# Patient Record
Sex: FEMALE | Race: BLACK OR AFRICAN AMERICAN | NOT HISPANIC OR LATINO | Employment: FULL TIME | URBAN - METROPOLITAN AREA
[De-identification: names, ages, dates, MRNs, and addresses within clinical notes are randomized per-mention and may not be internally consistent; named-entity substitution may affect disease eponyms.]

---

## 2023-04-07 ENCOUNTER — HOSPITAL ENCOUNTER (OUTPATIENT)
Dept: DATA CONVERSION | Facility: HOSPITAL | Age: 53
End: 2023-04-07
Attending: INTERNAL MEDICINE | Admitting: INTERNAL MEDICINE

## 2023-04-07 DIAGNOSIS — Z00.6 ENCOUNTER FOR EXAMINATION FOR NORMAL COMPARISON AND CONTROL IN CLINICAL RESEARCH PROGRAM: ICD-10-CM

## 2023-05-04 LAB
ALANINE AMINOTRANSFERASE (SGPT) (U/L) IN SER/PLAS: 12 U/L (ref 7–45)
ALBUMIN (G/DL) IN SER/PLAS: 4.1 G/DL (ref 3.4–5)
ALBUMIN (G/DL) IN SER/PLAS: 4.1 G/DL (ref 3.4–5)
ALKALINE PHOSPHATASE (U/L) IN SER/PLAS: 85 U/L (ref 33–110)
ANION GAP IN SER/PLAS: 14 MMOL/L (ref 10–20)
ANION GAP IN SER/PLAS: 14 MMOL/L (ref 10–20)
ASPARTATE AMINOTRANSFERASE (SGOT) (U/L) IN SER/PLAS: 19 U/L (ref 9–39)
BASOPHILS (10*3/UL) IN BLOOD BY AUTOMATED COUNT: 0.03 X10E9/L (ref 0–0.1)
BASOPHILS/100 LEUKOCYTES IN BLOOD BY AUTOMATED COUNT: 0.3 % (ref 0–2)
BILIRUBIN TOTAL (MG/DL) IN SER/PLAS: 0.4 MG/DL (ref 0–1.2)
CALCIDIOL (25 OH VITAMIN D3) (NG/ML) IN SER/PLAS: 24 NG/ML
CALCIDIOL (25 OH VITAMIN D3) (NG/ML) IN SER/PLAS: 25 NG/ML
CALCIUM (MG/DL) IN SER/PLAS: 9.4 MG/DL (ref 8.6–10.6)
CALCIUM (MG/DL) IN SER/PLAS: 9.6 MG/DL (ref 8.6–10.6)
CARBON DIOXIDE, TOTAL (MMOL/L) IN SER/PLAS: 27 MMOL/L (ref 21–32)
CARBON DIOXIDE, TOTAL (MMOL/L) IN SER/PLAS: 28 MMOL/L (ref 21–32)
CD3+CD4+ ABSOLUTE: 0.36 X10E9/L (ref 0.35–2.74)
CD3+CD8+ ABSOLUTE: 0.33 X10E9/L (ref 0.08–1.49)
CD4/CD8 RATIO: 1.09 (ref 1–3.5)
CD45%: 100 %
CHLAMYDIA TRACH., AMPLIFIED: NEGATIVE
CHLORIDE (MMOL/L) IN SER/PLAS: 104 MMOL/L (ref 98–107)
CHLORIDE (MMOL/L) IN SER/PLAS: 105 MMOL/L (ref 98–107)
CP CD3+CD4+%: 24 % (ref 29–57)
CP CD3+CD8+%: 22 % (ref 7–31)
CREATININE (MG/DL) IN SER/PLAS: 0.72 MG/DL (ref 0.5–1.05)
CREATININE (MG/DL) IN SER/PLAS: 0.73 MG/DL (ref 0.5–1.05)
EOSINOPHILS (10*3/UL) IN BLOOD BY AUTOMATED COUNT: 0.14 X10E9/L (ref 0–0.7)
EOSINOPHILS/100 LEUKOCYTES IN BLOOD BY AUTOMATED COUNT: 1.4 % (ref 0–6)
ERYTHROCYTE DISTRIBUTION WIDTH (RATIO) BY AUTOMATED COUNT: 16.3 % (ref 11.5–14.5)
ERYTHROCYTE MEAN CORPUSCULAR HEMOGLOBIN CONCENTRATION (G/DL) BY AUTOMATED: 30 G/DL (ref 32–36)
ERYTHROCYTE MEAN CORPUSCULAR VOLUME (FL) BY AUTOMATED COUNT: 71 FL (ref 80–100)
ERYTHROCYTES (10*6/UL) IN BLOOD BY AUTOMATED COUNT: 6.16 X10E12/L (ref 4–5.2)
FMETH: ABNORMAL
FSIT1: ABNORMAL
GFR FEMALE: >90 ML/MIN/1.73M2
GFR FEMALE: >90 ML/MIN/1.73M2
GLUCOSE (MG/DL) IN SER/PLAS: 69 MG/DL (ref 74–99)
GLUCOSE (MG/DL) IN SER/PLAS: 72 MG/DL (ref 74–99)
HEMATOCRIT (%) IN BLOOD BY AUTOMATED COUNT: 44 % (ref 36–46)
HEMOGLOBIN (G/DL) IN BLOOD: 13.2 G/DL (ref 12–16)
IMMATURE GRANULOCYTES/100 LEUKOCYTES IN BLOOD BY AUTOMATED COUNT: 0.2 % (ref 0–0.9)
IRON (UG/DL) IN SER/PLAS: 95 UG/DL (ref 35–150)
IRON BINDING CAPACITY (UG/DL) IN SER/PLAS: 329 UG/DL (ref 240–445)
IRON SATURATION (%) IN SER/PLAS: 29 % (ref 25–45)
LEUKOCYTES (10*3/UL) IN BLOOD BY AUTOMATED COUNT: 9.7 X10E9/L (ref 4.4–11.3)
LYMPHOCYTES (10*3/UL) IN BLOOD BY AUTOMATED COUNT: 1.49 X10E9/L (ref 1.2–4.8)
LYMPHOCYTES/100 LEUKOCYTES IN BLOOD BY AUTOMATED COUNT: 15.4 % (ref 13–44)
MAGNESIUM (MG/DL) IN SER/PLAS: 1.95 MG/DL (ref 1.6–2.4)
MARKER INTERPRETATION: ABNORMAL
MONOCYTES (10*3/UL) IN BLOOD BY AUTOMATED COUNT: 0.55 X10E9/L (ref 0.1–1)
MONOCYTES/100 LEUKOCYTES IN BLOOD BY AUTOMATED COUNT: 5.7 % (ref 2–10)
N. GONORRHEA, AMPLIFIED: NEGATIVE
NEUTROPHILS (10*3/UL) IN BLOOD BY AUTOMATED COUNT: 7.46 X10E9/L (ref 1.2–7.7)
NEUTROPHILS/100 LEUKOCYTES IN BLOOD BY AUTOMATED COUNT: 77 % (ref 40–80)
NRBC (PER 100 WBCS) BY AUTOMATED COUNT: 0 /100 WBC (ref 0–0)
PARATHYRIN INTACT (PG/ML) IN SER/PLAS: 53.6 PG/ML (ref 18.5–88)
PHOSPHATE (MG/DL) IN SER/PLAS: 3.9 MG/DL (ref 2.5–4.9)
PLATELETS (10*3/UL) IN BLOOD AUTOMATED COUNT: 276 X10E9/L (ref 150–450)
POTASSIUM (MMOL/L) IN SER/PLAS: 4.1 MMOL/L (ref 3.5–5.3)
POTASSIUM (MMOL/L) IN SER/PLAS: 4.1 MMOL/L (ref 3.5–5.3)
PROTEIN TOTAL: 7.1 G/DL (ref 6.4–8.2)
SODIUM (MMOL/L) IN SER/PLAS: 141 MMOL/L (ref 136–145)
SODIUM (MMOL/L) IN SER/PLAS: 143 MMOL/L (ref 136–145)
SYPHILIS TOTAL AB: NONREACTIVE
THYROPEROXIDASE AB (IU/ML) IN SER/PLAS: 74 IU/ML
THYROTROPIN (MIU/L) IN SER/PLAS BY DETECTION LIMIT <= 0.05 MIU/L: 4.3 MIU/L (ref 0.44–3.98)
THYROXINE (T4) FREE (NG/DL) IN SER/PLAS: 0.87 NG/DL (ref 0.78–1.48)
TRIIODOTHYRONINE (T3) (NG/DL) IN SER/PLAS: 86 NG/DL (ref 60–200)
UREA NITROGEN (MG/DL) IN SER/PLAS: 17 MG/DL (ref 6–23)
UREA NITROGEN (MG/DL) IN SER/PLAS: 18 MG/DL (ref 6–23)

## 2023-05-05 LAB
HIV-1 RNA PCR VIRAL LOAD LOG: NORMAL LOG10 CPY/ML
HIV-1 RNA VIRAL LOAD: NOT DETECTED COPIES/ML

## 2023-05-08 LAB — VITAMIN D 1,25-DIHYDROXY: 73.4 PG/ML (ref 19.9–79.3)

## 2023-05-25 LAB
AMPHETAMINE (PRESENCE) IN URINE BY SCREEN METHOD: NORMAL
BARBITURATES PRESENCE IN URINE BY SCREEN METHOD: NORMAL
BENZODIAZEPINE (PRESENCE) IN URINE BY SCREEN METHOD: NORMAL
CANNABINOIDS IN URINE BY SCREEN METHOD: NORMAL
COCAINE (PRESENCE) IN URINE BY SCREEN METHOD: NORMAL
DRUG SCREEN COMMENT URINE: NORMAL
FENTANYL URINE: NORMAL
METHADONE (PRESENCE) IN URINE BY SCREEN METHOD: NORMAL
OPIATES (PRESENCE) IN URINE BY SCREEN METHOD: NORMAL
OXYCODONE (PRESENCE) IN URINE BY SCREEN METHOD: NORMAL
PHENCYCLIDINE (PRESENCE) IN URINE BY SCREEN METHOD: NORMAL

## 2023-05-29 LAB — ETHYL GLUCURONIDE SCREEN: POSITIVE NG/ML

## 2023-05-31 LAB
ETHYL GLUCURONIDE: NORMAL NG/ML
ETHYL SULFATE,U: NORMAL NG/ML

## 2023-06-01 LAB
6-ACETYLMORPHINE: <25 NG/ML
AMPHETAMINE (PRESENCE) IN URINE BY SCREEN METHOD: NORMAL
BARBITURATES PRESENCE IN URINE BY SCREEN METHOD: NORMAL
BENZODIAZEPINE (PRESENCE) IN URINE BY SCREEN METHOD: NORMAL
CANNABINOIDS IN URINE BY SCREEN METHOD: NORMAL
COCAINE (PRESENCE) IN URINE BY SCREEN METHOD: NORMAL
CODEINE: <50 NG/ML
DRUG SCREEN COMMENT URINE: NORMAL
FENTANYL URINE: NORMAL
HYDROCODONE: <25 NG/ML
HYDROMORPHONE: <25 NG/ML
METHADONE (PRESENCE) IN URINE BY SCREEN METHOD: NORMAL
MORPHINE URINE: <50 NG/ML
NORHYDROCODONE: <25 NG/ML
NOROXYCODONE: <25 NG/ML
OPIATES (PRESENCE) IN URINE BY SCREEN METHOD: NORMAL
OXYCODONE (PRESENCE) IN URINE BY SCREEN METHOD: NORMAL
OXYCODONE: <25 NG/ML
OXYMORPHONE: <25 NG/ML
PHENCYCLIDINE (PRESENCE) IN URINE BY SCREEN METHOD: NORMAL

## 2023-06-05 LAB — ETHYL GLUCURONIDE SCREEN: NEGATIVE NG/ML

## 2023-06-06 LAB
7-AMINOCLONAZEPAM: <25 NG/ML
ALPHA-HYDROXYALPRAZOLAM: <25 NG/ML
ALPHA-HYDROXYMIDAZOLAM: <25 NG/ML
ALPRAZOLAM: <25 NG/ML
AMPHETAMINE (PRESENCE) IN URINE BY SCREEN METHOD: NORMAL
BARBITURATES PRESENCE IN URINE BY SCREEN METHOD: NORMAL
BENZODIAZEPINE (PRESENCE) IN URINE BY SCREEN METHOD: NORMAL
CANNABINOIDS IN URINE BY SCREEN METHOD: NORMAL
CHLORDIAZEPOXIDE: <25 NG/ML
CLONAZEPAM: <25 NG/ML
COCAINE (PRESENCE) IN URINE BY SCREEN METHOD: NORMAL
DIAZEPAM: <25 NG/ML
DRUG SCREEN COMMENT URINE: NORMAL
FENTANYL URINE: NORMAL
LORAZEPAM: <25 NG/ML
METHADONE (PRESENCE) IN URINE BY SCREEN METHOD: NORMAL
MIDAZOLAM: <25 NG/ML
NORDIAZEPAM: <25 NG/ML
OPIATES (PRESENCE) IN URINE BY SCREEN METHOD: NORMAL
OXAZEPAM: <25 NG/ML
OXYCODONE (PRESENCE) IN URINE BY SCREEN METHOD: NORMAL
PHENCYCLIDINE (PRESENCE) IN URINE BY SCREEN METHOD: NORMAL
TEMAZEPAM: <25 NG/ML

## 2023-06-08 LAB
6-ACETYLMORPHINE: <25 NG/ML
7-AMINOCLONAZEPAM: <25 NG/ML
ALPHA-HYDROXYALPRAZOLAM: <25 NG/ML
ALPHA-HYDROXYMIDAZOLAM: <25 NG/ML
ALPRAZOLAM: <25 NG/ML
CHLORDIAZEPOXIDE: <25 NG/ML
CLONAZEPAM: <25 NG/ML
CODEINE: <50 NG/ML
DIAZEPAM: <25 NG/ML
HYDROCODONE: <25 NG/ML
HYDROMORPHONE: <25 NG/ML
LORAZEPAM: <25 NG/ML
MIDAZOLAM: <25 NG/ML
MORPHINE URINE: <50 NG/ML
NORDIAZEPAM: <25 NG/ML
NORHYDROCODONE: <25 NG/ML
NOROXYCODONE: <25 NG/ML
OXAZEPAM: <25 NG/ML
OXYCODONE: <25 NG/ML
OXYMORPHONE: <25 NG/ML
TEMAZEPAM: <25 NG/ML

## 2023-06-11 LAB — ETHYL GLUCURONIDE SCREEN: NEGATIVE NG/ML

## 2023-06-29 LAB — ETHYL GLUCURONIDE SCREEN: NEGATIVE NG/ML

## 2023-07-10 LAB — ETHYL GLUCURONIDE SCREEN: NEGATIVE NG/ML

## 2023-09-06 VITALS — BODY MASS INDEX: 38.86 KG/M2 | WEIGHT: 233.25 LBS | HEIGHT: 65 IN

## 2023-09-07 LAB
ALANINE AMINOTRANSFERASE (SGPT) (U/L) IN SER/PLAS: 14 U/L (ref 7–45)
ALBUMIN (G/DL) IN SER/PLAS: 4.2 G/DL (ref 3.4–5)
ALKALINE PHOSPHATASE (U/L) IN SER/PLAS: 84 U/L (ref 33–110)
ANION GAP IN SER/PLAS: 12 MMOL/L (ref 10–20)
ASPARTATE AMINOTRANSFERASE (SGOT) (U/L) IN SER/PLAS: 18 U/L (ref 9–39)
BASOPHILS (10*3/UL) IN BLOOD BY AUTOMATED COUNT: 0.04 X10E9/L (ref 0–0.1)
BASOPHILS/100 LEUKOCYTES IN BLOOD BY AUTOMATED COUNT: 0.5 % (ref 0–2)
BILIRUBIN TOTAL (MG/DL) IN SER/PLAS: 0.5 MG/DL (ref 0–1.2)
CALCIDIOL (25 OH VITAMIN D3) (NG/ML) IN SER/PLAS: 25 NG/ML
CALCIUM (MG/DL) IN SER/PLAS: 9.3 MG/DL (ref 8.6–10.6)
CARBON DIOXIDE, TOTAL (MMOL/L) IN SER/PLAS: 29 MMOL/L (ref 21–32)
CD3+CD4+ ABSOLUTE: 0.41 X10E9/L (ref 0.35–2.74)
CD3+CD8+ ABSOLUTE: 0.35 X10E9/L (ref 0.08–1.49)
CD4/CD8 RATIO: 1.18 (ref 1–3.5)
CD45%: 100 %
CHLORIDE (MMOL/L) IN SER/PLAS: 106 MMOL/L (ref 98–107)
CHOLESTEROL (MG/DL) IN SER/PLAS: 158 MG/DL (ref 0–199)
CHOLESTEROL IN HDL (MG/DL) IN SER/PLAS: 76.2 MG/DL
CHOLESTEROL/HDL RATIO: 2.1
CP CD3+CD4+%: 26 % (ref 29–57)
CP CD3+CD8+%: 22 % (ref 7–31)
CREATININE (MG/DL) IN SER/PLAS: 0.73 MG/DL (ref 0.5–1.05)
EOSINOPHILS (10*3/UL) IN BLOOD BY AUTOMATED COUNT: 0.13 X10E9/L (ref 0–0.7)
EOSINOPHILS/100 LEUKOCYTES IN BLOOD BY AUTOMATED COUNT: 1.7 % (ref 0–6)
ERYTHROCYTE DISTRIBUTION WIDTH (RATIO) BY AUTOMATED COUNT: 16.9 % (ref 11.5–14.5)
ERYTHROCYTE MEAN CORPUSCULAR HEMOGLOBIN CONCENTRATION (G/DL) BY AUTOMATED: 29.7 G/DL (ref 32–36)
ERYTHROCYTE MEAN CORPUSCULAR VOLUME (FL) BY AUTOMATED COUNT: 72 FL (ref 80–100)
ERYTHROCYTES (10*6/UL) IN BLOOD BY AUTOMATED COUNT: 5.61 X10E12/L (ref 4–5.2)
ESTIMATED AVERAGE GLUCOSE FOR HBA1C: 114 MG/DL
FMETH: ABNORMAL
FSIT1: ABNORMAL
GFR FEMALE: >90 ML/MIN/1.73M2
GLUCOSE (MG/DL) IN SER/PLAS: 60 MG/DL (ref 74–99)
HEMATOCRIT (%) IN BLOOD BY AUTOMATED COUNT: 40.4 % (ref 36–46)
HEMOGLOBIN (G/DL) IN BLOOD: 12 G/DL (ref 12–16)
HEMOGLOBIN A1C/HEMOGLOBIN TOTAL IN BLOOD: 5.6 %
IMMATURE GRANULOCYTES/100 LEUKOCYTES IN BLOOD BY AUTOMATED COUNT: 1.2 % (ref 0–0.9)
LDL: 66 MG/DL (ref 0–99)
LEUKOCYTES (10*3/UL) IN BLOOD BY AUTOMATED COUNT: 7.4 X10E9/L (ref 4.4–11.3)
LYMPHOCYTES (10*3/UL) IN BLOOD BY AUTOMATED COUNT: 1.58 X10E9/L (ref 1.2–4.8)
LYMPHOCYTES/100 LEUKOCYTES IN BLOOD BY AUTOMATED COUNT: 21.2 % (ref 13–44)
MARKER INTERPRETATION: ABNORMAL
MONOCYTES (10*3/UL) IN BLOOD BY AUTOMATED COUNT: 0.53 X10E9/L (ref 0.1–1)
MONOCYTES/100 LEUKOCYTES IN BLOOD BY AUTOMATED COUNT: 7.1 % (ref 2–10)
NEUTROPHILS (10*3/UL) IN BLOOD BY AUTOMATED COUNT: 5.07 X10E9/L (ref 1.2–7.7)
NEUTROPHILS/100 LEUKOCYTES IN BLOOD BY AUTOMATED COUNT: 68.3 % (ref 40–80)
NRBC (PER 100 WBCS) BY AUTOMATED COUNT: 0 /100 WBC (ref 0–0)
PLATELETS (10*3/UL) IN BLOOD AUTOMATED COUNT: 304 X10E9/L (ref 150–450)
POTASSIUM (MMOL/L) IN SER/PLAS: 4.3 MMOL/L (ref 3.5–5.3)
PROTEIN TOTAL: 7.3 G/DL (ref 6.4–8.2)
SODIUM (MMOL/L) IN SER/PLAS: 143 MMOL/L (ref 136–145)
TRIGLYCERIDE (MG/DL) IN SER/PLAS: 79 MG/DL (ref 0–149)
UREA NITROGEN (MG/DL) IN SER/PLAS: 14 MG/DL (ref 6–23)
VLDL: 16 MG/DL (ref 0–40)

## 2023-09-08 LAB
HIV-1 RNA PCR VIRAL LOAD LOG: NORMAL LOG10 CPY/ML
HIV-1 RNA VIRAL LOAD: NOT DETECTED COPIES/ML

## 2023-09-14 ENCOUNTER — LAB (OUTPATIENT)
Dept: LAB | Facility: LAB | Age: 53
End: 2023-09-14

## 2023-09-14 LAB — SYPHILIS TOTAL AB: NONREACTIVE

## 2023-09-15 LAB
CHLAMYDIA TRACH., AMPLIFIED: NEGATIVE
N. GONORRHEA, AMPLIFIED: NEGATIVE
TRICHOMONAS VAGINALIS: NEGATIVE

## 2023-10-26 ENCOUNTER — DOCUMENTATION (OUTPATIENT)
Dept: IMMUNOLOGY | Facility: CLINIC | Age: 53
End: 2023-10-26
Payer: COMMERCIAL

## 2023-10-26 DIAGNOSIS — F32.A DEPRESSION, UNSPECIFIED DEPRESSION TYPE: ICD-10-CM

## 2023-10-26 NOTE — PROGRESS NOTES
"RN forwards call from Pt. Pt reports that she had an incident at work and she needs to connect to psychiatry - had \"a breakdown.\" Pt states that she called  psych and was advised that she needs an updated referral. Sw will request a new psych access clinic referral be placed for Pt. Sw and Pt discuss safety - Pt states that she has someone with her in the home, agrees to go to ER if she feels she cannot remain safe or is a danger to herself. Pt is requesting that MD write a temporary fill of her Sertraline - states he has previously advised that she must see psychiatry for same. Sw will speak to care team, will keep Pt updated. Sw does advise that it is likely that Pt will need to follow with psychiatry as previously advised.     Rasta speaks to RN who will ask MD to submit psych access referral order.   "

## 2023-10-27 ENCOUNTER — DOCUMENTATION (OUTPATIENT)
Dept: IMMUNOLOGY | Facility: CLINIC | Age: 53
End: 2023-10-27
Payer: COMMERCIAL

## 2023-10-27 NOTE — PROGRESS NOTES
10/27/2023; Shaista stopped in clinic today stating her employer has put her on unpaid medical leave until she is seen/treated for her mental health.  I had her call and schedule a psych access appointment while she was here.  She will be seen on Tuesday, 10/31.  Shaista said she'll be ok this weekend, has plans to dress up for Halloween and go out with friends.  Said she has a good support system of family and friends.  She stated she knows to go to the ER or call 211 for help if she doesn't feel ok or safe.  She thanked me for my help and support and gave me a big hug.  She will call if she has any questions/concerns.  PAM Patterson RN

## 2023-10-30 ENCOUNTER — SPECIALTY PHARMACY (OUTPATIENT)
Dept: PHARMACY | Facility: CLINIC | Age: 53
End: 2023-10-30

## 2023-10-30 ENCOUNTER — PHARMACY VISIT (OUTPATIENT)
Dept: PHARMACY | Facility: CLINIC | Age: 53
End: 2023-10-30
Payer: MEDICARE

## 2023-10-30 PROBLEM — R31.29 HEMATURIA, MICROSCOPIC: Status: ACTIVE | Noted: 2023-10-30

## 2023-10-30 PROBLEM — G47.33 OBSTRUCTIVE SLEEP APNEA: Status: ACTIVE | Noted: 2023-10-30

## 2023-10-30 PROBLEM — E83.10 DISORDER OF IRON METABOLISM: Status: ACTIVE | Noted: 2023-10-30

## 2023-10-30 PROBLEM — M77.9 BONE SPUR: Status: ACTIVE | Noted: 2023-10-30

## 2023-10-30 PROBLEM — G47.00 INSOMNIA: Status: ACTIVE | Noted: 2023-10-30

## 2023-10-30 PROBLEM — Z98.84 WEIGHT GAIN FOLLOWING GASTRIC BYPASS SURGERY: Status: ACTIVE | Noted: 2023-10-30

## 2023-10-30 PROBLEM — E51.9 VITAMIN B1 DEFICIENCY: Status: ACTIVE | Noted: 2023-10-30

## 2023-10-30 PROBLEM — I10 HYPERTENSION: Status: ACTIVE | Noted: 2023-10-30

## 2023-10-30 PROBLEM — M17.0 ARTHRITIS OF BOTH KNEES: Status: ACTIVE | Noted: 2023-10-30

## 2023-10-30 PROBLEM — F10.10 ALCOHOL ABUSE: Status: ACTIVE | Noted: 2023-10-30

## 2023-10-30 PROBLEM — G25.81 RLS (RESTLESS LEGS SYNDROME): Status: ACTIVE | Noted: 2023-10-30

## 2023-10-30 PROBLEM — E03.9 HYPOTHYROID: Status: ACTIVE | Noted: 2023-10-30

## 2023-10-30 PROBLEM — F41.9 ANXIETY: Status: ACTIVE | Noted: 2023-10-30

## 2023-10-30 PROBLEM — M72.2 PLANTAR FASCIITIS, RIGHT: Status: ACTIVE | Noted: 2023-10-30

## 2023-10-30 PROBLEM — D50.9 IRON DEFICIENCY ANEMIA: Status: ACTIVE | Noted: 2023-10-30

## 2023-10-30 PROBLEM — F32.A DEPRESSION: Status: ACTIVE | Noted: 2023-10-30

## 2023-10-30 PROBLEM — K21.9 GASTROESOPHAGEAL REFLUX DISEASE: Status: ACTIVE | Noted: 2023-10-30

## 2023-10-30 PROBLEM — B20 AIDS (MULTI): Status: ACTIVE | Noted: 2023-10-30

## 2023-10-30 PROBLEM — E55.9 VITAMIN D DEFICIENCY DISEASE: Status: ACTIVE | Noted: 2023-10-30

## 2023-10-30 PROBLEM — M76.60 ACHILLES TENDINITIS: Status: ACTIVE | Noted: 2023-10-30

## 2023-10-30 PROBLEM — E66.01 MORBID OBESITY (MULTI): Status: ACTIVE | Noted: 2023-10-30

## 2023-10-30 PROBLEM — R42 EPISODIC LIGHTHEADEDNESS: Status: ACTIVE | Noted: 2023-10-30

## 2023-10-30 PROBLEM — F10.20 ALCOHOL USE DISORDER, SEVERE, DEPENDENCE (MULTI): Status: ACTIVE | Noted: 2023-10-30

## 2023-10-30 PROBLEM — F33.9 MAJOR DEPRESSIVE DISORDER, RECURRENT (CMS-HCC): Status: ACTIVE | Noted: 2023-10-30

## 2023-10-30 PROBLEM — R63.5 WEIGHT GAIN FOLLOWING GASTRIC BYPASS SURGERY: Status: ACTIVE | Noted: 2023-10-30

## 2023-10-30 PROCEDURE — RXMED WILLOW AMBULATORY MEDICATION CHARGE

## 2023-10-30 RX ORDER — FERROUS SULFATE 325(65) MG
65 TABLET ORAL
COMMUNITY
Start: 2022-06-02

## 2023-10-30 RX ORDER — ERGOCALCIFEROL 1.25 MG/1
1.25 CAPSULE ORAL
COMMUNITY

## 2023-10-30 RX ORDER — FAMOTIDINE 20 MG/1
1 TABLET, FILM COATED ORAL EVERY MORNING
COMMUNITY
Start: 2022-09-01

## 2023-10-30 RX ORDER — ACETAMINOPHEN 500 MG
2 TABLET ORAL DAILY
COMMUNITY
Start: 2018-12-20

## 2023-10-30 RX ORDER — NALTREXONE HYDROCHLORIDE 50 MG/1
1 TABLET, FILM COATED ORAL DAILY
COMMUNITY
Start: 2021-11-19 | End: 2023-10-31 | Stop reason: SDUPTHER

## 2023-10-30 RX ORDER — GABAPENTIN 100 MG/1
1 CAPSULE ORAL 3 TIMES DAILY
COMMUNITY
Start: 2021-11-16

## 2023-10-30 RX ORDER — ALBUTEROL SULFATE 90 UG/1
2 AEROSOL, METERED RESPIRATORY (INHALATION)
COMMUNITY

## 2023-10-30 RX ORDER — CYCLOBENZAPRINE HCL 10 MG
10 TABLET ORAL 3 TIMES DAILY PRN
COMMUNITY
Start: 2018-07-20

## 2023-10-30 RX ORDER — NAPROXEN 500 MG/1
500 TABLET ORAL 2 TIMES DAILY PRN
COMMUNITY
Start: 2018-07-20

## 2023-10-31 ENCOUNTER — PHARMACY VISIT (OUTPATIENT)
Dept: PHARMACY | Facility: CLINIC | Age: 53
End: 2023-10-31
Payer: MEDICARE

## 2023-10-31 ENCOUNTER — OFFICE VISIT (OUTPATIENT)
Dept: BEHAVIORAL HEALTH | Facility: CLINIC | Age: 53
End: 2023-10-31
Payer: COMMERCIAL

## 2023-10-31 DIAGNOSIS — F32.A DEPRESSION, UNSPECIFIED DEPRESSION TYPE: ICD-10-CM

## 2023-10-31 PROCEDURE — 90792 PSYCH DIAG EVAL W/MED SRVCS: CPT | Performed by: STUDENT IN AN ORGANIZED HEALTH CARE EDUCATION/TRAINING PROGRAM

## 2023-10-31 PROCEDURE — 3077F SYST BP >= 140 MM HG: CPT | Performed by: STUDENT IN AN ORGANIZED HEALTH CARE EDUCATION/TRAINING PROGRAM

## 2023-10-31 PROCEDURE — 3080F DIAST BP >= 90 MM HG: CPT | Performed by: STUDENT IN AN ORGANIZED HEALTH CARE EDUCATION/TRAINING PROGRAM

## 2023-10-31 PROCEDURE — RXMED WILLOW AMBULATORY MEDICATION CHARGE

## 2023-10-31 RX ORDER — SERTRALINE HYDROCHLORIDE 100 MG/1
200 TABLET, FILM COATED ORAL DAILY
Qty: 60 TABLET | Refills: 5 | Status: SHIPPED | OUTPATIENT
Start: 2023-10-31 | End: 2024-10-30

## 2023-10-31 RX ORDER — NALTREXONE HYDROCHLORIDE 50 MG/1
50 TABLET, FILM COATED ORAL DAILY
Qty: 30 TABLET | Refills: 5 | Status: SHIPPED | OUTPATIENT
Start: 2023-10-31 | End: 2024-10-30

## 2023-10-31 NOTE — PSYCHOTHERAPY
Outpatient Psychiatry    Subjective   Shaista Smith, a 53 y.o. female, presenting to [facility] for evaluation.  Patient is referred by Anat Hillman MD .      HPI:  Patient reports that she was first diagnosed with depression in 2010, 1 year after her diagnosis with HIV.  Reports that she was started on sertraline which was very effective in controlling her symptoms.  She also struggles with alcohol use disorder for which she is on naltrexone 50 mg daily.    Today's visit was requested following an incident at work.  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  She however had to wait 30 days before getting her benefits.  In the interim she did not have insurance and so was not taking the sertraline.  She went to work last Tuesday, October 24 with her  uniform however forgot certain items including her belt in handcuffs.  She normally could get through the day without these items however felt very upset and started crying uncontrollably.  She requested to be assigned to the control room and shared with them that she was having a bad day because she was off her medications. Her supervisor referred her to .  She was sent home and asked to come back the next day.  When she returned she was told that she was on leave without pain until she restarted her medication and returned with a provider's note saying she was fit to resume work.  She is concerned that she might not have a job anymore.    Patient reports that she stopped taking the sertraline second week of September however after the incident at work she resumed the medication by taking 1-1/2 tablet daily.  Said she only had 1 weeks worth of medication left and so will be running out of the medication tomorrow.    Patient used to follow-up with psychiatry here at  however provider retired and she got lost during the transition.    Patient currently endorses depressed mood, difficulty  coping, anxiety, sleep disturbances, decreased appetite.  She denies any SI or HI.    Regarding her alcohol use, she reports that with the naltrexone she has been able to cut down from drinking and getting drunk daily to having 1-2 drinks 2 to 3 days a week.  She she does report partial compliance to the naltrexone.    Patient lives alone with her dog but reports that she has a very strong family support with her sister living in the same apartment building.      Psychiatric Review Of Systems:  Depressive Symptoms: appetite decreased, concentration, energy, interest, sleep decreased , and tearfulness  Manic Symptoms: negative  Anxiety Symptoms: General Anxiety Disorder (ANA)ANA Behaviors: excessive anxiety/worry, difficulty concentrating, and irritability  Psychotic Symptoms: negative  Other Symptoms:    Current Medications:    Current Outpatient Medications:     albuterol (Ventolin HFA) 90 mcg/actuation inhaler, Inhale 2 puffs. Every 4-6 hours as needed, Disp: , Rfl:     amLODIPine (Norvasc) 10 mg tablet, TAKE 1 TABLET BY MOUTH ONCE DAILY, Disp: 30 tablet, Rfl: 5    cholecalciferol (Vitamin D-3) 50 mcg (2,000 unit) capsule, Take 2 capsules (4,000 Units) by mouth once daily., Disp: , Rfl:     cyclobenzaprine (Flexeril) 10 mg tablet, Take 1 tablet (10 mg) by mouth 3 times a day as needed., Disp: , Rfl:     mvyronjype-preeqasn-fkpuwv ala (Complera) 200-25-25 mg tablet, TAKE ONE (1) TABLET BY MOUTH ONCE DAILY., Disp: 30 tablet, Rfl: 5    ergocalciferol (Vitamin D-2) 1.25 MG (58044 UT) capsule, Take 1 capsule (1,250 mcg) by mouth 1 (one) time per week., Disp: , Rfl:     famotidine (Pepcid) 20 mg tablet, Take 1 tablet (20 mg) by mouth once daily in the morning., Disp: , Rfl:     ferrous sulfate 325 (65 Fe) MG tablet, Take 1 tablet (65 mg of iron) by mouth 3 times a day with meals., Disp: , Rfl:     folic acid (Folvite) 1 mg tablet, TAKE 1 TABLET BY MOUTH ONCE DAILY., Disp: 30 tablet, Rfl: 5    gabapentin (Neurontin) 100  mg capsule, Take 1 capsule (100 mg) by mouth 3 times a day., Disp: , Rfl:     hydroCHLOROthiazide (HYDRODiuril) 25 mg tablet, TAKE 1 TABLET BY MOUTH ONCE DAILY, Disp: 30 tablet, Rfl: 5    naltrexone (Depade) 50 mg tablet, Take 1 tablet (50 mg) by mouth once daily., Disp: 30 tablet, Rfl: 5    naproxen (Naprosyn) 500 mg tablet, Take 1 tablet (500 mg) by mouth 2 times a day as needed (pain)., Disp: , Rfl:     pediatric multivitamin tablet,chewable chewable tablet, CHEW 1 TABLET AND SWALLOW ONCE DAILY., Disp: 30 each, Rfl: 5    sertraline (Zoloft) 100 mg tablet, TAKE 2 TABLETS BY MOUTH ONCE DAILY, Disp: 60 tablet, Rfl: 5    thiamine (Vitamin B-1) 100 mg tablet, TAKE 1 TABLET BY MOUTH ONCE DAILY, Disp: 30 tablet, Rfl: 5    Medical History:  Past Medical History:   Diagnosis Date    Abnormal cytological findings in specimens from other organs, systems and tissues     LSIL (low grade squamous intraepithelial lesion) on Pap smear    Abnormal weight loss 07/12/2016    Rapid weight loss    Acute candidiasis of vulva and vagina 10/03/2016    Yeast infection of the vagina    Anogenital (venereal) warts     Genital warts    Encounter for general adult medical examination without abnormal findings 09/28/2020    Encounter for preventive health examination    Encounter for screening for infections with a predominantly sexual mode of transmission 04/01/2019    Screening for STDs (sexually transmitted diseases)    Encounter for screening for malignant neoplasm of colon 08/31/2018    Screening for colon cancer    Food insecurity 01/05/2023    Food insecurity    Human immunodeficiency virus (HIV) disease (CMS/[facility])     HIV disease    Impacted cerumen, bilateral 08/31/2018    Bilateral impacted cerumen    Morbid (severe) obesity due to excess calories (CMS/[facility]) 03/23/2015    Psychological factors affecting morbid obesity    Nausea 07/07/2015    Postoperative nausea    Other acute postprocedural pain 07/12/2016    Acute post-operative  pain    Personal history of other diseases of the musculoskeletal system and connective tissue     Personal history of arthritis    Personal history of other endocrine, nutritional and metabolic disease     History of type 2 diabetes mellitus    Personal history of other endocrine, nutritional and metabolic disease 07/12/2016    History of diabetes mellitus    Personal history of other infectious and parasitic diseases     History of trichomoniasis    Personal history of other infectious and parasitic diseases     History of gonorrhea    Personal history of other infectious and parasitic diseases 05/05/2015    History of Helicobacter infection    Personal history of other infectious and parasitic diseases 03/23/2017    History of trichomoniasis    Personal history of other infectious and parasitic diseases 02/02/2016    History of trichomoniasis    Personal history of other specified conditions 08/25/2015    History of wheezing    Personal history of urinary (tract) infections 02/26/2018    History of urinary tract infection    Personal history of urinary (tract) infections     Personal history of urinary tract infection    Personal history of urinary (tract) infections 08/31/2018    History of urinary tract infection    Unspecified abnormal findings in urine 10/03/2017    Abnormal urine    Urinary tract infection, site not specified     E. coli UTI    Urinary tract infection, site not specified 02/23/2018    Urinary tract infection with hematuria, site unspecified       Past Psychiatric History:   Diagnoses:   Previous Psychiatrist:  Therapy:   Current psychiatric medications:  Past psychiatric medications:  Past psychiatric treatments:   Hospitalizations:  Suicide attempts:   Family psychiatric history:    Social History:   Currently lives:  Education:   History of Learning Problem:  Work/Finances:  Marital history/children:  Current stressors:  Social support:   Legal History:    History:  History of  violence:   Access to Weapons:   Guardian/POA/Payee:      Substance Use History:  Tobacco use:   Use of alcohol: moderate  Use of caffeine: denies use  Use of other substances:   Legal consequences of substance use:   Substance use disorder treatment:     Record Review: moderate     Medical Review Of Systems:    Objective   Mental Status Exam  Appearance: Well-appearing  Attitude: Calm, cooperative, and engaged in conversation.  Behavior: Appropriate eye contact.   Motor Activity: No psychomotor agitation or retardation. No abnormal movements, tremors or tics. No evidence of extrapyramidal symptoms or tardive dyskinesia.  Speech: Regular rate, rhythm, volume. Spontaneous, no pressured speech.  Mood: Appropriate  Affect: Euthymic, full range, mood congruent.  Thought Process: Linear, logical, and goal-directed. No loose associations or gross thought disorganization.  Thought Content: Denied current suicidal ideation or thoughts of harm to self, denied homicidal ideation or thoughts of harm to others. No delusional thinking elicited. No perseverations or obsessions identified.   Perception: Did not endorse auditory or visual hallucinations, did not appear to be responding to hallucinatory stimuli.   Cognition: Alert, oriented x3. Preserved attention span and concentration, recent and remote memory. Adequate fund of knowledge. No deficits in language.   Insight: Fair, in regards to understanding mental health condition  Judgement: Fair    Assessment/Plan   53-year-old female with a psychiatric history of depression, anxiety and alcohol use disorder presenting after she had a breakdown at work after being off of her medications for about 1 month.    Impression:  MDD  Anxiety  Alcohol use disorder    Risk Assessment:  Risk of harm to self: Low Risk -- Risk factors include: Alcohol or other substance abuse, Depression, History of alcohol or other substance use disorder , and History of not adhering to treatment or  medication regimen  Protective factors include:Denies current suicidal ideation, Denies history of suicide attempts , Future-oriented talk , Willingness to seek help and support , Access to a variety of clinical interventions , Interpersonal relationships and supports, e.g., family, friends, peers, community , and Restricted access to firearms or other lethal means of suicide     Risk of harm to others: Low Risk - Risk factors include: No significant risk factors identified on screening. Protective factors include: Lack of known history of harm to others , Lack of known history of violent ideation , Lack of known access to firearms , and Positive, pro-social family/peer network     Plan/Recommendations:  Medications: Zoloft, Naltrexone  Orders: Renewed prescription for Zoloft and naltrexone for total of 6 months  Follow up: Referral sent for follow-up with psychiatry as well as PCP  Information provided to schedule psychotherapy  Call  Psychiatry at [phone] with issues.  For [facility] residents, [employer] is a 24/7 hotline you can call for assistance [[phone]]. Please call [phone] or go to your closest Emergency Room if you feel unsafe. This includes thoughts of hurting yourself or anyone else, or having other troubles such as hearing voices, seeing visions, or having new and scary thoughts about the people around you.    Review with patient: Treatment plan reviewed with the patient.    Seen and discussed with attending, Dr. Young, who agrees with above.     Severine L Kako, MD

## 2023-10-31 NOTE — PROGRESS NOTES
Outpatient Psychiatry    Subjective   Shaista Smith, a 53 y.o. female, presenting [facility].  Patient is referred by Anat Hillman MD .      HPI:  Patient reports that she was first diagnosed with depression in 2010, 1 year after her diagnosis with HIV.  Reports that she was started on sertraline which was very effective in controlling her symptoms.  She also struggles with alcohol use disorder for which she is on naltrexone 50 mg daily.    Today's visit was requested following an incident at work.  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  She however had to wait 30 days before getting her benefits.  In the interim she did not have insurance and so was not taking the sertraline.  She went to work last Tuesday, October 24 with her  uniform however forgot certain items including her belt in handcuffs.  She normally could get through the day without these items however felt very upset and started crying uncontrollably.  She requested to be assigned to the control room and shared with them that she was having a bad day because she was off her medications. Her supervisor referred her to .  She was sent home and asked to come back the next day.  When she returned she was told that she was on leave without pain until she restarted her medication and returned with a provider's note saying she was fit to resume work.  She is concerned that she might not have a job anymore.    Patient reports that she stopped taking the sertraline second week of September however after the incident at work she resumed the medication by taking 1-1/2 tablet daily.  Said she only had 1 weeks worth of medication left and so will be running out of the medication tomorrow.    Patient used to follow-up with psychiatry here at  however provider retired and she got lost during the transition.    Patient currently endorses depressed mood, difficulty  coping, anxiety, sleep disturbances, decreased appetite.  She denies any SI or HI.    Regarding her alcohol use, she reports that with the naltrexone she has been able to cut down from drinking and getting drunk daily to having 1-2 drinks 2 to 3 days a week.  She she does report partial compliance to the naltrexone.    Patient lives alone with her dog but reports that she has a very strong family support with her sister living in the same apartment building.      Psychiatric Review Of Systems:  Depressive Symptoms: appetite decreased, concentration, energy, interest, sleep decreased , and tearfulness  Manic Symptoms: negative  Anxiety Symptoms: General Anxiety Disorder (ANA)ANA Behaviors: excessive anxiety/worry, difficulty concentrating, and irritability  Psychotic Symptoms: negative  Other Symptoms:    Current Medications:    Current Outpatient Medications:     albuterol (Ventolin HFA) 90 mcg/actuation inhaler, Inhale 2 puffs. Every 4-6 hours as needed, Disp: , Rfl:     amLODIPine (Norvasc) 10 mg tablet, TAKE 1 TABLET BY MOUTH ONCE DAILY, Disp: 30 tablet, Rfl: 5    cholecalciferol (Vitamin D-3) 50 mcg (2,000 unit) capsule, Take 2 capsules (4,000 Units) by mouth once daily., Disp: , Rfl:     cyclobenzaprine (Flexeril) 10 mg tablet, Take 1 tablet (10 mg) by mouth 3 times a day as needed., Disp: , Rfl:     dtltiurnog-gchnjknb-pzlysf ala (Complera) 200-25-25 mg tablet, TAKE ONE (1) TABLET BY MOUTH ONCE DAILY., Disp: 30 tablet, Rfl: 5    ergocalciferol (Vitamin D-2) 1.25 MG (49631 UT) capsule, Take 1 capsule (1,250 mcg) by mouth 1 (one) time per week., Disp: , Rfl:     famotidine (Pepcid) 20 mg tablet, Take 1 tablet (20 mg) by mouth once daily in the morning., Disp: , Rfl:     ferrous sulfate 325 (65 Fe) MG tablet, Take 1 tablet (65 mg of iron) by mouth 3 times a day with meals., Disp: , Rfl:     folic acid (Folvite) 1 mg tablet, TAKE 1 TABLET BY MOUTH ONCE DAILY., Disp: 30 tablet, Rfl: 5    gabapentin (Neurontin) 100  mg capsule, Take 1 capsule (100 mg) by mouth 3 times a day., Disp: , Rfl:     hydroCHLOROthiazide (HYDRODiuril) 25 mg tablet, TAKE 1 TABLET BY MOUTH ONCE DAILY, Disp: 30 tablet, Rfl: 5    naltrexone (Depade) 50 mg tablet, Take 1 tablet (50 mg) by mouth once daily., Disp: 30 tablet, Rfl: 5    naproxen (Naprosyn) 500 mg tablet, Take 1 tablet (500 mg) by mouth 2 times a day as needed (pain)., Disp: , Rfl:     pediatric multivitamin tablet,chewable chewable tablet, CHEW 1 TABLET AND SWALLOW ONCE DAILY., Disp: 30 each, Rfl: 5    sertraline (Zoloft) 100 mg tablet, TAKE 2 TABLETS BY MOUTH ONCE DAILY, Disp: 60 tablet, Rfl: 5    thiamine (Vitamin B-1) 100 mg tablet, TAKE 1 TABLET BY MOUTH ONCE DAILY, Disp: 30 tablet, Rfl: 5    Medical History:  Past Medical History:   Diagnosis Date    Abnormal cytological findings in specimens from other organs, systems and tissues     LSIL (low grade squamous intraepithelial lesion) on Pap smear    Abnormal weight loss 07/12/2016    Rapid weight loss    Acute candidiasis of vulva and vagina 10/03/2016    Yeast infection of the vagina    Anogenital (venereal) warts     Genital warts    Encounter for general adult medical examination without abnormal findings 09/28/2020    Encounter for preventive health examination    Encounter for screening for infections with a predominantly sexual mode of transmission 04/01/2019    Screening for STDs (sexually transmitted diseases)    Encounter for screening for malignant neoplasm of colon 08/31/2018    Screening for colon cancer    Food insecurity 01/05/2023    Food insecurity    Human immunodeficiency virus (HIV) disease (CMS/[facility])     HIV disease    Impacted cerumen, bilateral 08/31/2018    Bilateral impacted cerumen    Morbid (severe) obesity due to excess calories (CMS/[facility]) 03/23/2015    Psychological factors affecting morbid obesity    Nausea 07/07/2015    Postoperative nausea    Other acute postprocedural pain 07/12/2016    Acute post-operative  pain    Personal history of other diseases of the musculoskeletal system and connective tissue     Personal history of arthritis    Personal history of other endocrine, nutritional and metabolic disease     History of type 2 diabetes mellitus    Personal history of other endocrine, nutritional and metabolic disease 07/12/2016    History of diabetes mellitus    Personal history of other infectious and parasitic diseases     History of trichomoniasis    Personal history of other infectious and parasitic diseases     History of gonorrhea    Personal history of other infectious and parasitic diseases 05/05/2015    History of Helicobacter infection    Personal history of other infectious and parasitic diseases 03/23/2017    History of trichomoniasis    Personal history of other infectious and parasitic diseases 02/02/2016    History of trichomoniasis    Personal history of other specified conditions 08/25/2015    History of wheezing    Personal history of urinary (tract) infections 02/26/2018    History of urinary tract infection    Personal history of urinary (tract) infections     Personal history of urinary tract infection    Personal history of urinary (tract) infections 08/31/2018    History of urinary tract infection    Unspecified abnormal findings in urine 10/03/2017    Abnormal urine    Urinary tract infection, site not specified     E. coli UTI    Urinary tract infection, site not specified 02/23/2018    Urinary tract infection with hematuria, site unspecified       Past Psychiatric History:   Diagnoses:   Previous Psychiatrist:  Therapy:   Current psychiatric medications:  Past psychiatric medications:  Past psychiatric treatments:   Hospitalizations:  Suicide attempts:   Family psychiatric history:    Social History:   Currently lives:  Education:   History of Learning Problem:  Work/Finances:  Marital history/children:  Current stressors:  Social support:   Legal History:    History:  History of  violence:   Access to Weapons:   Guardian/POA/Payee:      Substance Use History:  Tobacco use:   Use of alcohol: moderate  Use of caffeine: denies use  Use of other substances:   Legal consequences of substance use:   Substance use disorder treatment:     Record Review: moderate     Medical Review Of Systems:    Objective   Mental Status Exam  Appearance: Well-appearing  Attitude: Calm, cooperative, and engaged in conversation.  Behavior: Appropriate eye contact.   Motor Activity: No psychomotor agitation or retardation. No abnormal movements, tremors or tics. No evidence of extrapyramidal symptoms or tardive dyskinesia.  Speech: Regular rate, rhythm, volume. Spontaneous, no pressured speech.  Mood: Appropriate  Affect: Euthymic, full range, mood congruent.  Thought Process: Linear, logical, and goal-directed. No loose associations or gross thought disorganization.  Thought Content: Denied current suicidal ideation or thoughts of harm to self, denied homicidal ideation or thoughts of harm to others. No delusional thinking elicited. No perseverations or obsessions identified.   Perception: Did not endorse auditory or visual hallucinations, did not appear to be responding to hallucinatory stimuli.   Cognition: Alert, oriented x3. Preserved attention span and concentration, recent and remote memory. Adequate fund of knowledge. No deficits in language.   Insight: Fair, in regards to understanding mental health condition  Judgement: Fair    Assessment/Plan   53-year-old female with a psychiatric history of depression, anxiety and alcohol use disorder presenting after she had a breakdown at work after being off of her medications for about 1 month.    Impression:  MDD  Anxiety  Alcohol use disorder    Risk Assessment:  Risk of harm to self: Low Risk -- Risk factors include: Alcohol or other substance abuse, Depression, History of alcohol or other substance use disorder , and History of not adhering to treatment or  medication regimen  Protective factors include:Denies current suicidal ideation, Denies history of suicide attempts , Future-oriented talk , Willingness to seek help and support , Access to a variety of clinical interventions , Interpersonal relationships and supports, e.g., family, friends, peers, community , and Restricted access to firearms or other lethal means of suicide     Risk of harm to others: Low Risk - Risk factors include: No significant risk factors identified on screening. Protective factors include: Lack of known history of harm to others , Lack of known history of violent ideation , Lack of known access to firearms , and Positive, pro-social family/peer network     Plan/Recommendations:  Medications: Zoloft, Naltrexone  Orders: Renewed prescription for Zoloft and naltrexone for total of 6 months  Follow up: Referral sent for follow-up with psychiatry as well as PCP  Information provided to schedule psychotherapy  Call  Psychiatry at [phone] with issues.  For [facility] residents, [employer] is a 24/7 hotline you can call for assistance [[phone]]. Please call 684/414 or go to your closest Emergency Room if you feel unsafe. This includes thoughts of hurting yourself or anyone else, or having other troubles such as hearing voices, seeing visions, or having new and scary thoughts about the people around you.    Review with patient: Treatment plan reviewed with the patient.    Seen and discussed with attending, Dr. Young, who agrees with above.     Severine L Kako, MD

## 2023-11-28 ENCOUNTER — TELEMEDICINE (OUTPATIENT)
Dept: BEHAVIORAL HEALTH | Facility: CLINIC | Age: 53
End: 2023-11-28
Payer: COMMERCIAL

## 2023-11-28 DIAGNOSIS — B20 AIDS (MULTI): ICD-10-CM

## 2024-01-04 ENCOUNTER — HOSPITAL ENCOUNTER (OUTPATIENT)
Dept: RADIOLOGY | Facility: HOSPITAL | Age: 54
Discharge: HOME | End: 2024-01-04
Payer: COMMERCIAL

## 2024-01-04 ENCOUNTER — APPOINTMENT (OUTPATIENT)
Dept: IMMUNOLOGY | Facility: CLINIC | Age: 54
End: 2024-01-04
Payer: COMMERCIAL

## 2024-01-04 ENCOUNTER — CLINICAL SUPPORT (OUTPATIENT)
Dept: IMMUNOLOGY | Facility: CLINIC | Age: 54
End: 2024-01-04
Payer: COMMERCIAL

## 2024-01-04 DIAGNOSIS — E11.69 TYPE 2 DIABETES MELLITUS WITH OTHER SPECIFIED COMPLICATION, WITH LONG-TERM CURRENT USE OF INSULIN (MULTI): ICD-10-CM

## 2024-01-04 DIAGNOSIS — R05.9 COUGH, UNSPECIFIED TYPE: ICD-10-CM

## 2024-01-04 DIAGNOSIS — E66.9 OBESITY (BMI 30-39.9): ICD-10-CM

## 2024-01-04 DIAGNOSIS — E55.9 VITAMIN D DEFICIENCY: ICD-10-CM

## 2024-01-04 DIAGNOSIS — Z79.4 TYPE 2 DIABETES MELLITUS WITH OTHER SPECIFIED COMPLICATION, WITH LONG-TERM CURRENT USE OF INSULIN (MULTI): ICD-10-CM

## 2024-01-04 DIAGNOSIS — B20 HIV DISEASE (MULTI): ICD-10-CM

## 2024-01-04 DIAGNOSIS — Z79.899 HIGH RISK MEDICATION USE: ICD-10-CM

## 2024-01-04 DIAGNOSIS — B20 HUMAN IMMUNODEFICIENCY VIRUS (HIV) DISEASE (MULTI): ICD-10-CM

## 2024-01-04 LAB
25(OH)D3 SERPL-MCNC: 24 NG/ML (ref 30–100)
ALBUMIN SERPL BCP-MCNC: 4 G/DL (ref 3.4–5)
ALP SERPL-CCNC: 81 U/L (ref 33–110)
ALT SERPL W P-5'-P-CCNC: 11 U/L (ref 7–45)
ANION GAP SERPL CALC-SCNC: 14 MMOL/L (ref 10–20)
AST SERPL W P-5'-P-CCNC: 15 U/L (ref 9–39)
BASOPHILS # BLD AUTO: 0.03 X10*3/UL (ref 0–0.1)
BASOPHILS NFR BLD AUTO: 0.4 %
BILIRUB SERPL-MCNC: 0.8 MG/DL (ref 0–1.2)
BUN SERPL-MCNC: 13 MG/DL (ref 6–23)
CALCIUM SERPL-MCNC: 9.5 MG/DL (ref 8.6–10.6)
CD3+CD4+ CELLS # BLD: 0.39 X10E9/L
CD3+CD4+ CELLS # BLD: 386 /MM3
CD3+CD4+ CELLS NFR BLD: 24 %
CD3+CD4+ CELLS/CD3+CD8+ CLL BLD: 0.92 %
CD3+CD8+ CELLS # BLD: 0.42 X10E9/L
CD3+CD8+ CELLS NFR BLD: 26 %
CHLORIDE SERPL-SCNC: 106 MMOL/L (ref 98–107)
CO2 SERPL-SCNC: 28 MMOL/L (ref 21–32)
CREAT SERPL-MCNC: 0.78 MG/DL (ref 0.5–1.05)
EOSINOPHIL # BLD AUTO: 0.16 X10*3/UL (ref 0–0.7)
EOSINOPHIL NFR BLD AUTO: 1.9 %
ERYTHROCYTE [DISTWIDTH] IN BLOOD BY AUTOMATED COUNT: 17.2 % (ref 11.5–14.5)
EST. AVERAGE GLUCOSE BLD GHB EST-MCNC: 123 MG/DL
FLOW CYTOMETRY SPECIALIST REVIEW: ABNORMAL
GFR SERPL CREATININE-BSD FRML MDRD: 90 ML/MIN/1.73M*2
GLUCOSE SERPL-MCNC: 89 MG/DL (ref 74–99)
HBA1C MFR BLD: 5.9 %
HCT VFR BLD AUTO: 40.5 % (ref 36–46)
HGB BLD-MCNC: 12.5 G/DL (ref 12–16)
IMM GRANULOCYTES # BLD AUTO: 0.03 X10*3/UL (ref 0–0.7)
IMM GRANULOCYTES NFR BLD AUTO: 0.4 % (ref 0–0.9)
LYMPHOCYTES # BLD AUTO: 1.61 X10*3/UL (ref 1.2–4.8)
LYMPHOCYTES # SPEC AUTO: 1.61 X10*3/UL
LYMPHOCYTES NFR BLD AUTO: 19.5 %
MCH RBC QN AUTO: 21.4 PG (ref 26–34)
MCHC RBC AUTO-ENTMCNC: 30.9 G/DL (ref 32–36)
MCV RBC AUTO: 69 FL (ref 80–100)
MONOCYTES # BLD AUTO: 0.59 X10*3/UL (ref 0.1–1)
MONOCYTES NFR BLD AUTO: 7.2 %
NEUTROPHILS # BLD AUTO: 5.83 X10*3/UL (ref 1.2–7.7)
NEUTROPHILS NFR BLD AUTO: 70.6 %
NRBC BLD-RTO: 0 /100 WBCS (ref 0–0)
PLATELET # BLD AUTO: 271 X10*3/UL (ref 150–450)
POTASSIUM SERPL-SCNC: 4.2 MMOL/L (ref 3.5–5.3)
PROT SERPL-MCNC: 7 G/DL (ref 6.4–8.2)
RBC # BLD AUTO: 5.84 X10*6/UL (ref 4–5.2)
SODIUM SERPL-SCNC: 144 MMOL/L (ref 136–145)
T4 SERPL-MCNC: 5 UG/DL (ref 4.5–11.1)
TSH SERPL-ACNC: 4.25 MIU/L (ref 0.44–3.98)
WBC # BLD AUTO: 8.3 X10*3/UL (ref 4.4–11.3)

## 2024-01-04 PROCEDURE — 80053 COMPREHEN METABOLIC PANEL: CPT

## 2024-01-04 PROCEDURE — 71046 X-RAY EXAM CHEST 2 VIEWS: CPT

## 2024-01-04 PROCEDURE — RXMED WILLOW AMBULATORY MEDICATION CHARGE

## 2024-01-04 PROCEDURE — 83036 HEMOGLOBIN GLYCOSYLATED A1C: CPT

## 2024-01-04 PROCEDURE — 84443 ASSAY THYROID STIM HORMONE: CPT

## 2024-01-04 PROCEDURE — 84436 ASSAY OF TOTAL THYROXINE: CPT

## 2024-01-04 PROCEDURE — 36415 COLL VENOUS BLD VENIPUNCTURE: CPT

## 2024-01-04 PROCEDURE — 82306 VITAMIN D 25 HYDROXY: CPT

## 2024-01-04 PROCEDURE — 71046 X-RAY EXAM CHEST 2 VIEWS: CPT | Performed by: RADIOLOGY

## 2024-01-04 PROCEDURE — 88185 FLOWCYTOMETRY/TC ADD-ON: CPT | Mod: TC

## 2024-01-04 PROCEDURE — 87536 HIV-1 QUANT&REVRSE TRNSCRPJ: CPT

## 2024-01-04 PROCEDURE — 85025 COMPLETE CBC W/AUTO DIFF WBC: CPT

## 2024-01-05 LAB
HIV1 RNA # PLAS NAA DL=20: 30 COPIES/ML
HIV1 RNA # PLAS NAA DL=20: DETECTED {COPIES}/ML
HIV1 RNA SPEC NAA+PROBE-LOG#: 1.48 LOG10 CPY/ML

## 2024-01-09 ENCOUNTER — PHARMACY VISIT (OUTPATIENT)
Dept: PHARMACY | Facility: CLINIC | Age: 54
End: 2024-01-09
Payer: MEDICARE

## 2024-01-09 PROCEDURE — RXMED WILLOW AMBULATORY MEDICATION CHARGE

## 2024-01-24 ENCOUNTER — SPECIALTY PHARMACY (OUTPATIENT)
Dept: PHARMACY | Facility: CLINIC | Age: 54
End: 2024-01-24

## 2024-01-24 PROCEDURE — RXMED WILLOW AMBULATORY MEDICATION CHARGE

## 2024-01-25 ENCOUNTER — PHARMACY VISIT (OUTPATIENT)
Dept: PHARMACY | Facility: CLINIC | Age: 54
End: 2024-01-25
Payer: MEDICARE

## 2024-03-08 ENCOUNTER — SPECIALTY PHARMACY (OUTPATIENT)
Dept: PHARMACY | Facility: CLINIC | Age: 54
End: 2024-03-08

## 2024-03-27 ENCOUNTER — SPECIALTY PHARMACY (OUTPATIENT)
Dept: PHARMACY | Facility: CLINIC | Age: 54
End: 2024-03-27

## 2024-04-12 ENCOUNTER — DOCUMENTATION (OUTPATIENT)
Dept: IMMUNOLOGY | Facility: CLINIC | Age: 54
End: 2024-04-12
Payer: COMMERCIAL

## 2024-04-12 NOTE — PROGRESS NOTES
Patient called.  Would like an appointment with Dr. Gallegos to discuss the possibility of switching to Cabenuva.  Patient scheduled to see MD on April 25, 2024.

## 2024-04-15 ENCOUNTER — SPECIALTY PHARMACY (OUTPATIENT)
Dept: PHARMACY | Facility: CLINIC | Age: 54
End: 2024-04-15

## 2024-04-25 ENCOUNTER — OFFICE VISIT (OUTPATIENT)
Dept: IMMUNOLOGY | Facility: CLINIC | Age: 54
End: 2024-04-25
Payer: COMMERCIAL

## 2024-04-25 ENCOUNTER — DOCUMENTATION (OUTPATIENT)
Dept: IMMUNOLOGY | Facility: CLINIC | Age: 54
End: 2024-04-25
Payer: COMMERCIAL

## 2024-04-25 VITALS
WEIGHT: 232.4 LBS | RESPIRATION RATE: 18 BRPM | DIASTOLIC BLOOD PRESSURE: 113 MMHG | OXYGEN SATURATION: 100 % | BODY MASS INDEX: 37.35 KG/M2 | TEMPERATURE: 98.5 F | HEART RATE: 62 BPM | HEIGHT: 66 IN | SYSTOLIC BLOOD PRESSURE: 181 MMHG

## 2024-04-25 DIAGNOSIS — I10 HYPERTENSION, UNSPECIFIED TYPE: ICD-10-CM

## 2024-04-25 DIAGNOSIS — Z98.84 WEIGHT GAIN FOLLOWING GASTRIC BYPASS SURGERY: ICD-10-CM

## 2024-04-25 DIAGNOSIS — B20 HIV INFECTION, UNSPECIFIED SYMPTOM STATUS (MULTI): ICD-10-CM

## 2024-04-25 DIAGNOSIS — R63.5 WEIGHT GAIN FOLLOWING GASTRIC BYPASS SURGERY: ICD-10-CM

## 2024-04-25 DIAGNOSIS — F32.1 CURRENT MODERATE EPISODE OF MAJOR DEPRESSIVE DISORDER, UNSPECIFIED WHETHER RECURRENT (MULTI): ICD-10-CM

## 2024-04-25 DIAGNOSIS — F10.20 ALCOHOL USE DISORDER, SEVERE, DEPENDENCE (MULTI): ICD-10-CM

## 2024-04-25 DIAGNOSIS — B20 AIDS (MULTI): Primary | ICD-10-CM

## 2024-04-25 LAB
ALBUMIN SERPL BCP-MCNC: 4.1 G/DL (ref 3.4–5)
ALP SERPL-CCNC: 94 U/L (ref 33–110)
ALT SERPL W P-5'-P-CCNC: 13 U/L (ref 7–45)
ANION GAP SERPL CALC-SCNC: 17 MMOL/L (ref 10–20)
AST SERPL W P-5'-P-CCNC: 16 U/L (ref 9–39)
BASOPHILS # BLD AUTO: 0.02 X10*3/UL (ref 0–0.1)
BASOPHILS NFR BLD AUTO: 0.3 %
BILIRUB SERPL-MCNC: 0.5 MG/DL (ref 0–1.2)
BUN SERPL-MCNC: 12 MG/DL (ref 6–23)
CALCIUM SERPL-MCNC: 9.4 MG/DL (ref 8.6–10.6)
CD3+CD4+ CELLS # BLD: 0.21 X10E9/L
CD3+CD4+ CELLS # BLD: 209 /MM3
CD3+CD4+ CELLS NFR BLD: 17 %
CD3+CD4+ CELLS/CD3+CD8+ CLL BLD: 0.52 %
CD3+CD8+ CELLS # BLD: 0.41 X10E9/L
CD3+CD8+ CELLS NFR BLD: 33 %
CHLORIDE SERPL-SCNC: 103 MMOL/L (ref 98–107)
CO2 SERPL-SCNC: 24 MMOL/L (ref 21–32)
CREAT SERPL-MCNC: 0.72 MG/DL (ref 0.5–1.05)
EGFRCR SERPLBLD CKD-EPI 2021: >90 ML/MIN/1.73M*2
EOSINOPHIL # BLD AUTO: 0.11 X10*3/UL (ref 0–0.7)
EOSINOPHIL NFR BLD AUTO: 1.6 %
ERYTHROCYTE [DISTWIDTH] IN BLOOD BY AUTOMATED COUNT: 16.9 % (ref 11.5–14.5)
FLOW CYTOMETRY SPECIALIST REVIEW: ABNORMAL
GLUCOSE SERPL-MCNC: 87 MG/DL (ref 74–99)
HCT VFR BLD AUTO: 43.2 % (ref 36–46)
HGB BLD-MCNC: 13.1 G/DL (ref 12–16)
IMM GRANULOCYTES # BLD AUTO: 0.01 X10*3/UL (ref 0–0.7)
IMM GRANULOCYTES NFR BLD AUTO: 0.1 % (ref 0–0.9)
LYMPHOCYTES # BLD AUTO: 1.23 X10*3/UL (ref 1.2–4.8)
LYMPHOCYTES # SPEC AUTO: 1.23 X10*3/UL
LYMPHOCYTES NFR BLD AUTO: 17.8 %
MCH RBC QN AUTO: 20.9 PG (ref 26–34)
MCHC RBC AUTO-ENTMCNC: 30.3 G/DL (ref 32–36)
MCV RBC AUTO: 69 FL (ref 80–100)
MONOCYTES # BLD AUTO: 0.57 X10*3/UL (ref 0.1–1)
MONOCYTES NFR BLD AUTO: 8.2 %
NEUTROPHILS # BLD AUTO: 4.97 X10*3/UL (ref 1.2–7.7)
NEUTROPHILS NFR BLD AUTO: 72 %
NRBC BLD-RTO: 0 /100 WBCS (ref 0–0)
PLATELET # BLD AUTO: 262 X10*3/UL (ref 150–450)
POTASSIUM SERPL-SCNC: 4 MMOL/L (ref 3.5–5.3)
PROT SERPL-MCNC: 7.5 G/DL (ref 6.4–8.2)
RBC # BLD AUTO: 6.26 X10*6/UL (ref 4–5.2)
SODIUM SERPL-SCNC: 140 MMOL/L (ref 136–145)
WBC # BLD AUTO: 6.9 X10*3/UL (ref 4.4–11.3)

## 2024-04-25 PROCEDURE — 87536 HIV-1 QUANT&REVRSE TRNSCRPJ: CPT | Performed by: INTERNAL MEDICINE

## 2024-04-25 PROCEDURE — 36415 COLL VENOUS BLD VENIPUNCTURE: CPT | Performed by: INTERNAL MEDICINE

## 2024-04-25 PROCEDURE — 88185 FLOWCYTOMETRY/TC ADD-ON: CPT | Mod: TC | Performed by: INTERNAL MEDICINE

## 2024-04-25 PROCEDURE — 99215 OFFICE O/P EST HI 40 MIN: CPT | Performed by: INTERNAL MEDICINE

## 2024-04-25 PROCEDURE — 3077F SYST BP >= 140 MM HG: CPT | Performed by: INTERNAL MEDICINE

## 2024-04-25 PROCEDURE — 85025 COMPLETE CBC W/AUTO DIFF WBC: CPT | Performed by: INTERNAL MEDICINE

## 2024-04-25 PROCEDURE — 3080F DIAST BP >= 90 MM HG: CPT | Performed by: INTERNAL MEDICINE

## 2024-04-25 PROCEDURE — 80053 COMPREHEN METABOLIC PANEL: CPT | Performed by: INTERNAL MEDICINE

## 2024-04-25 PROCEDURE — 99215 OFFICE O/P EST HI 40 MIN: CPT | Mod: 25 | Performed by: INTERNAL MEDICINE

## 2024-04-25 ASSESSMENT — ENCOUNTER SYMPTOMS
PSYCHIATRIC NEGATIVE: 1
NECK PAIN: 0
CONSTIPATION: 0
ANAL BLEEDING: 0
SINUS PRESSURE: 0
ALLERGIC/IMMUNOLOGIC NEGATIVE: 1
BLOOD IN STOOL: 0
DECREASED CONCENTRATION: 0
HYPERACTIVE: 0
BRUISES/BLEEDS EASILY: 0
WOUND: 0
FATIGUE: 0
DIFFICULTY URINATING: 0
CHILLS: 0
ABDOMINAL PAIN: 0
DIZZINESS: 0
RHINORRHEA: 0
ABDOMINAL DISTENTION: 0
BACK PAIN: 0
NERVOUS/ANXIOUS: 0
PHOTOPHOBIA: 0
CARDIOVASCULAR NEGATIVE: 1
DIARRHEA: 0
HEADACHES: 0
TREMORS: 0
HEMATURIA: 0
EYE REDNESS: 0
EYES NEGATIVE: 1
DYSPHORIC MOOD: 0
COLOR CHANGE: 0
JOINT SWELLING: 0
ARTHRALGIAS: 0
SPEECH DIFFICULTY: 0
SINUS PAIN: 0
EYE PAIN: 0
COUGH: 0
MUSCULOSKELETAL NEGATIVE: 1
SLEEP DISTURBANCE: 0
NAUSEA: 0
CONFUSION: 0
EYE ITCHING: 0
ADENOPATHY: 0
NUMBNESS: 0
SHORTNESS OF BREATH: 0
RECTAL PAIN: 0
DIAPHORESIS: 0
EYE DISCHARGE: 0
VOMITING: 0
NEUROLOGICAL NEGATIVE: 1
WHEEZING: 0
APPETITE CHANGE: 0
MYALGIAS: 0
SORE THROAT: 0
CONSTITUTIONAL NEGATIVE: 1
ENDOCRINE NEGATIVE: 1
LIGHT-HEADEDNESS: 0
NECK STIFFNESS: 0
SEIZURES: 0
FLANK PAIN: 0
DYSURIA: 0
HALLUCINATIONS: 0
FREQUENCY: 0
GASTROINTESTINAL NEGATIVE: 1
HEMATOLOGIC/LYMPHATIC NEGATIVE: 1
FEVER: 0

## 2024-04-25 ASSESSMENT — PAIN SCALES - GENERAL: PAINLEVEL: 0-NO PAIN

## 2024-04-25 NOTE — ASSESSMENT & PLAN NOTE
She has been off all her meds since she does not want to trake pills. She wants to get Cabenuva. She was informed that this may be an option as long as her HIV has been well controlled for several months. She agreed to restart ART.

## 2024-04-25 NOTE — ASSESSMENT & PLAN NOTE
She continues to drink 4 shots of liquor after she finishes work. She has been in alcohol rehab program in the past. She was recommended to re-join the program. She was told that her depression and alcohol abuse are central problems that she needs to seek assistance for in order to improve her health (HIV, hypertension).

## 2024-04-25 NOTE — ASSESSMENT & PLAN NOTE
Her hypertension is out of control. She denies headaches, dizziness, chest pain, etc. She agreed to restart her anti-hypertensives.

## 2024-04-25 NOTE — PROGRESS NOTES
Rasta provides Pt with the contact information for  Psychiatry. Pt saw Dr. Zuluaga (attending Dr. Siegel) in 10/2023. Dr. Gallegos advised that he would not cover Pt's mental health medication as further follow up with psych indicated. Pt aware. Rasta encourages Pt to contact them if she has issues connecting to  psychiatry - outside referrals available if she wants to pursue.

## 2024-04-25 NOTE — PROGRESS NOTES
HIV Clinic Visit:   Shaista Smith is a 54 y.o. female was last seen in [facility] on today.    Missed antiretroviral doses in last 72 hours? Yes    Sexually active? No Partner/s aware of diagnosis? No    Condom use? No Partner on PrEP? No    Tobacco use: No    SUBJECTIVE: Mrs Smith is very upset because she states to be tired of taking pills. She has been off all her medications for months. She wants to get Cabenuva instead. She remains depressed (no suicidal ideation) and drinking 4 shots of liquor several days a week. She has not seen a psychiatrist since October of last year. She was recommended to re-joining an alcohol rehab program.  She agreed to resume her oral medications. We will refill them (except psych medications). She was informed that Cabenuva will be assessed in the future depending on her compliance and achieving good control of HIV.        Review of Systems   Constitutional: Negative.  Negative for appetite change, chills, diaphoresis, fatigue and fever.   HENT: Negative.  Negative for congestion, dental problem, ear discharge, ear pain, hearing loss, mouth sores, nosebleeds, rhinorrhea, sinus pressure, sinus pain, sneezing, sore throat and tinnitus.    Eyes: Negative.  Negative for photophobia, pain, discharge, redness, itching and visual disturbance.   Respiratory:  Negative for cough, shortness of breath and wheezing.    Cardiovascular: Negative.  Negative for chest pain.   Gastrointestinal: Negative.  Negative for abdominal distention, abdominal pain, anal bleeding, blood in stool, constipation, diarrhea, nausea, rectal pain and vomiting.   Endocrine: Negative.    Genitourinary: Negative.  Negative for difficulty urinating, dysuria, enuresis, flank pain, frequency, genital sores, hematuria and urgency.   Musculoskeletal: Negative.  Negative for arthralgias, back pain, gait problem, joint swelling, myalgias, neck pain and neck stiffness.   Skin: Negative.  Negative for color change, pallor, rash and  "wound.   Allergic/Immunologic: Negative.    Neurological: Negative.  Negative for dizziness, tremors, seizures, syncope, speech difficulty, light-headedness, numbness and headaches.   Hematological: Negative.  Negative for adenopathy. Does not bruise/bleed easily.   Psychiatric/Behavioral: Negative.  Negative for behavioral problems, confusion, decreased concentration, dysphoric mood, hallucinations, self-injury and sleep disturbance. The patient is not nervous/anxious and is not hyperactive.          Objective   Visit Vitals  BP (!) 181/113 (BP Location: Left arm, Patient Position: Sitting, BP Cuff Size: Large adult)   Pulse 62   Temp 36.9 °C (98.5 °F) (Skin)   Resp 18   Ht 1.676 m (5' 6\")   Wt 105 kg (232 lb 6.4 oz)   SpO2 100%   BMI 37.51 kg/m²   Smoking Status Unknown   BSA 2.21 m²        Physical Exam  Constitutional:       Appearance: Normal appearance.   HENT:      Head: Normocephalic and atraumatic.      Right Ear: Tympanic membrane normal.      Left Ear: Tympanic membrane normal.      Nose: Nose normal.      Mouth/Throat:      Mouth: Mucous membranes are moist.      Tongue: No lesions.      Palate: No mass.      Pharynx: Oropharynx is clear. No pharyngeal swelling, oropharyngeal exudate, posterior oropharyngeal erythema or uvula swelling.      Tonsils: No tonsillar exudate.   Eyes:      General: Lids are normal.      Extraocular Movements: Extraocular movements intact.      Conjunctiva/sclera: Conjunctivae normal.      Pupils: Pupils are equal, round, and reactive to light.   Neck:      Thyroid: No thyroid mass or thyroid tenderness.      Vascular: Normal carotid pulses. No carotid bruit or JVD.      Trachea: Trachea normal.   Cardiovascular:      Rate and Rhythm: Normal rate and regular rhythm.      Pulses: Normal pulses.      Heart sounds: Normal heart sounds.   Pulmonary:      Effort: Pulmonary effort is normal.      Breath sounds: Normal breath sounds.   Abdominal:      General: Abdomen is flat. Bowel " sounds are normal.      Palpations: Abdomen is soft. There is no hepatomegaly, splenomegaly or mass.      Tenderness: There is no abdominal tenderness.   Musculoskeletal:         General: No swelling, tenderness, deformity or signs of injury. Normal range of motion.      Cervical back: Full passive range of motion without pain, normal range of motion and neck supple.      Right lower leg: No edema.      Left lower leg: No edema.   Lymphadenopathy:      Cervical: No cervical adenopathy.   Skin:     General: Skin is warm and dry.   Neurological:      General: No focal deficit present.      Mental Status: She is alert and oriented to person, place, and time.   Psychiatric:         Mood and Affect: Mood normal.         CURRENT MEDICATIONS    Current Outpatient Medications:     albuterol (Ventolin HFA) 90 mcg/actuation inhaler, Inhale 2 puffs. Every 4-6 hours as needed, Disp: , Rfl:     amLODIPine (Norvasc) 10 mg tablet, TAKE 1 TABLET BY MOUTH ONCE DAILY, Disp: 30 tablet, Rfl: 5    cholecalciferol (Vitamin D-3) 50 mcg (2,000 unit) capsule, Take 2 capsules (4,000 Units) by mouth once daily., Disp: , Rfl:     cyclobenzaprine (Flexeril) 10 mg tablet, Take 1 tablet (10 mg) by mouth 3 times a day as needed., Disp: , Rfl:     tmxcguwyeb-ubvmbfeq-txvhsr ala (Odefsey) 200-25-25 mg tablet, TAKE ONE (1) TABLET BY MOUTH ONCE DAILY., Disp: 30 tablet, Rfl: 5    ergocalciferol (Vitamin D-2) 1.25 MG (68957 UT) capsule, Take 1 capsule (1,250 mcg) by mouth 1 (one) time per week., Disp: , Rfl:     famotidine (Pepcid) 20 mg tablet, Take 1 tablet (20 mg) by mouth once daily in the morning., Disp: , Rfl:     ferrous sulfate 325 (65 Fe) MG tablet, Take 1 tablet (65 mg of iron) by mouth 3 times a day with meals., Disp: , Rfl:     folic acid (Folvite) 1 mg tablet, TAKE 1 TABLET BY MOUTH ONCE DAILY., Disp: 30 tablet, Rfl: 5    gabapentin (Neurontin) 100 mg capsule, Take 1 capsule (100 mg) by mouth 3 times a day., Disp: , Rfl:      "hydroCHLOROthiazide (HYDRODiuril) 25 mg tablet, TAKE 1 TABLET BY MOUTH ONCE DAILY, Disp: 30 tablet, Rfl: 5    naltrexone (Depade) 50 mg tablet, Take 1 tablet (50 mg) by mouth once daily., Disp: 30 tablet, Rfl: 5    naproxen (Naprosyn) 500 mg tablet, Take 1 tablet (500 mg) by mouth 2 times a day as needed (pain)., Disp: , Rfl:     pediatric multivitamin tablet,chewable chewable tablet, CHEW 1 TABLET AND SWALLOW ONCE DAILY., Disp: 30 each, Rfl: 5    sertraline (Zoloft) 100 mg tablet, TAKE 2 TABLETS BY MOUTH ONCE DAILY, Disp: 60 tablet, Rfl: 5    thiamine (Vitamin B-1) 100 mg tablet, TAKE 1 TABLET BY MOUTH ONCE DAILY, Disp: 30 tablet, Rfl: 5       Relevant Results  Labs  Lab Results   Component Value Date    PIQ0VDJJA NOT DETECTED 09/07/2023    CW9UMD1RLTR 0.419 01/04/2024    VITD25 24 (L) 01/04/2024      Lab Results   Component Value Date    WBC 8.3 01/04/2024    HGB 12.5 01/04/2024    HCT 40.5 01/04/2024    MCV 69 (L) 01/04/2024     01/04/2024      Lab Results   Component Value Date    GLUCOSE 89 01/04/2024    CALCIUM 9.5 01/04/2024     01/04/2024    K 4.2 01/04/2024    CO2 28 01/04/2024     01/04/2024    BUN 13 01/04/2024    CREATININE 0.78 01/04/2024      Lab Results   Component Value Date    CHOL 158 09/07/2023    CHOL 150 12/21/2022    CHOL 168 09/01/2022     Lab Results   Component Value Date    HDL 76.2 09/07/2023    HDL 56.6 12/21/2022    HDL 61.1 09/01/2022     No results found for: \"LDLCALC\"  Lab Results   Component Value Date    TRIG 79 09/07/2023    TRIG 144 12/21/2022    TRIG 143 09/01/2022     No components found for: \"CHOLHDL\"       Assessment/Plan   Problem List Items Addressed This Visit       AIDS (Multi) - Primary    Alcohol use disorder, severe, dependence (Multi)    Depression    Hypertension    Weight gain following gastric bypass surgery          Tristin Gallegos MD  "

## 2024-04-25 NOTE — ASSESSMENT & PLAN NOTE
She continues to have depression. She has not seen a psychiatrist since October of last year. She was strongly recommended to set up an appointment and restart treatment. The [facility] () will give her the phone number to call to set up the appointment.

## 2024-04-26 LAB
HIV1 RNA # PLAS NAA DL=20: 74 COPIES/ML
HIV1 RNA # PLAS NAA DL=20: DETECTED {COPIES}/ML
HIV1 RNA SPEC NAA+PROBE-LOG#: 1.87 LOG10 CPY/ML

## 2024-05-14 DIAGNOSIS — Z00.6 RESEARCH EXAM: Primary | ICD-10-CM

## 2024-07-24 ENCOUNTER — APPOINTMENT (OUTPATIENT)
Dept: OBSTETRICS AND GYNECOLOGY | Facility: CLINIC | Age: 54
End: 2024-07-24
Payer: COMMERCIAL

## 2024-08-29 ENCOUNTER — APPOINTMENT (OUTPATIENT)
Dept: IMMUNOLOGY | Facility: CLINIC | Age: 54
End: 2024-08-29
Payer: COMMERCIAL

## 2024-09-05 ENCOUNTER — APPOINTMENT (OUTPATIENT)
Dept: IMMUNOLOGY | Facility: CLINIC | Age: 54
End: 2024-09-05
Payer: COMMERCIAL

## 2024-10-08 ENCOUNTER — SPECIALTY PHARMACY (OUTPATIENT)
Dept: PHARMACY | Facility: CLINIC | Age: 54
End: 2024-10-08

## 2024-10-08 DIAGNOSIS — B20 HIV DISEASE (MULTI): Primary | ICD-10-CM

## 2024-10-08 RX ORDER — EMTRICITABINE, RILPIVIRINE HYDROCHLORIDE, AND TENOFOVIR ALAFENAMIDE 200; 25; 25 MG/1; MG/1; MG/1
1 TABLET ORAL DAILY
Qty: 30 TABLET | Refills: 0 | Status: SHIPPED | OUTPATIENT
Start: 2024-10-08

## 2024-10-10 ENCOUNTER — APPOINTMENT (OUTPATIENT)
Dept: IMMUNOLOGY | Facility: CLINIC | Age: 54
End: 2024-10-10
Payer: COMMERCIAL

## 2024-10-10 ENCOUNTER — SPECIALTY PHARMACY (OUTPATIENT)
Dept: PHARMACY | Facility: CLINIC | Age: 54
End: 2024-10-10

## 2024-10-16 ENCOUNTER — DOCUMENTATION (OUTPATIENT)
Dept: IMMUNOLOGY | Facility: CLINIC | Age: 54
End: 2024-10-16
Payer: COMMERCIAL

## 2024-10-16 NOTE — PROGRESS NOTES
Patient called on October 8.  Requested that med be sent to  specialty pharmacy.  Patient left a message stating that med is to be called to [facility] pharmacy.  Left message for patient asking which [facility]?  And let her know that it was sent to  specialty.

## 2024-10-21 DIAGNOSIS — B20 HIV DISEASE (MULTI): ICD-10-CM

## 2024-10-22 DIAGNOSIS — B20 HIV DISEASE (MULTI): ICD-10-CM

## 2024-10-22 RX ORDER — EMTRICITABINE, RILPIVIRINE HYDROCHLORIDE, AND TENOFOVIR ALAFENAMIDE 200; 25; 25 MG/1; MG/1; MG/1
1 TABLET ORAL DAILY
Qty: 30 TABLET | Refills: 0 | Status: SHIPPED | OUTPATIENT
Start: 2024-10-22

## 2024-11-22 ENCOUNTER — OFFICE VISIT (OUTPATIENT)
Dept: IMMUNOLOGY | Facility: CLINIC | Age: 54
End: 2024-11-22
Payer: COMMERCIAL

## 2024-11-22 ENCOUNTER — DOCUMENTATION (OUTPATIENT)
Dept: IMMUNOLOGY | Facility: CLINIC | Age: 54
End: 2024-11-22
Payer: COMMERCIAL

## 2024-11-22 VITALS
BODY MASS INDEX: 36.74 KG/M2 | SYSTOLIC BLOOD PRESSURE: 176 MMHG | DIASTOLIC BLOOD PRESSURE: 98 MMHG | WEIGHT: 227.6 LBS | HEART RATE: 55 BPM | OXYGEN SATURATION: 100 %

## 2024-11-22 DIAGNOSIS — B20 HIV DISEASE (MULTI): ICD-10-CM

## 2024-11-22 DIAGNOSIS — F10.20 ALCOHOL USE DISORDER, SEVERE, DEPENDENCE (MULTI): ICD-10-CM

## 2024-11-22 DIAGNOSIS — F32.1 CURRENT MODERATE EPISODE OF MAJOR DEPRESSIVE DISORDER, UNSPECIFIED WHETHER RECURRENT (MULTI): ICD-10-CM

## 2024-11-22 DIAGNOSIS — I10 HYPERTENSION, UNSPECIFIED TYPE: Primary | ICD-10-CM

## 2024-11-22 DIAGNOSIS — Z12.31 ENCOUNTER FOR SCREENING MAMMOGRAM FOR MALIGNANT NEOPLASM OF BREAST: ICD-10-CM

## 2024-11-22 LAB
ALBUMIN SERPL BCP-MCNC: 4.3 G/DL (ref 3.4–5)
ALP SERPL-CCNC: 79 U/L (ref 33–110)
ALT SERPL W P-5'-P-CCNC: 9 U/L (ref 7–45)
ANION GAP SERPL CALC-SCNC: 14 MMOL/L (ref 10–20)
AST SERPL W P-5'-P-CCNC: 15 U/L (ref 9–39)
BASOPHILS # BLD AUTO: 0.02 X10*3/UL (ref 0–0.1)
BASOPHILS NFR BLD AUTO: 0.3 %
BILIRUB SERPL-MCNC: 0.5 MG/DL (ref 0–1.2)
BUN SERPL-MCNC: 12 MG/DL (ref 6–23)
CALCIUM SERPL-MCNC: 10.4 MG/DL (ref 8.6–10.6)
CHLORIDE SERPL-SCNC: 103 MMOL/L (ref 98–107)
CHOLEST SERPL-MCNC: 162 MG/DL (ref 0–199)
CHOLESTEROL/HDL RATIO: 3
CO2 SERPL-SCNC: 28 MMOL/L (ref 21–32)
CREAT SERPL-MCNC: 0.76 MG/DL (ref 0.5–1.05)
EGFRCR SERPLBLD CKD-EPI 2021: >90 ML/MIN/1.73M*2
EOSINOPHIL # BLD AUTO: 0.08 X10*3/UL (ref 0–0.7)
EOSINOPHIL NFR BLD AUTO: 1.1 %
ERYTHROCYTE [DISTWIDTH] IN BLOOD BY AUTOMATED COUNT: 15.7 % (ref 11.5–14.5)
GLUCOSE SERPL-MCNC: 108 MG/DL (ref 74–99)
HCT VFR BLD AUTO: 44.3 % (ref 36–46)
HDLC SERPL-MCNC: 54.6 MG/DL
HGB BLD-MCNC: 13.5 G/DL (ref 12–16)
IMM GRANULOCYTES # BLD AUTO: 0.02 X10*3/UL (ref 0–0.7)
IMM GRANULOCYTES NFR BLD AUTO: 0.3 % (ref 0–0.9)
LDLC SERPL CALC-MCNC: 84 MG/DL
LYMPHOCYTES # BLD AUTO: 1.04 X10*3/UL (ref 1.2–4.8)
LYMPHOCYTES NFR BLD AUTO: 14.8 %
MCH RBC QN AUTO: 21.2 PG (ref 26–34)
MCHC RBC AUTO-ENTMCNC: 30.5 G/DL (ref 32–36)
MCV RBC AUTO: 70 FL (ref 80–100)
MONOCYTES # BLD AUTO: 0.58 X10*3/UL (ref 0.1–1)
MONOCYTES NFR BLD AUTO: 8.2 %
NEUTROPHILS # BLD AUTO: 5.3 X10*3/UL (ref 1.2–7.7)
NEUTROPHILS NFR BLD AUTO: 75.3 %
NON HDL CHOLESTEROL: 107 MG/DL (ref 0–149)
NRBC BLD-RTO: 0.3 /100 WBCS (ref 0–0)
PLATELET # BLD AUTO: 264 X10*3/UL (ref 150–450)
POTASSIUM SERPL-SCNC: 3.9 MMOL/L (ref 3.5–5.3)
PROT SERPL-MCNC: 7.3 G/DL (ref 6.4–8.2)
RBC # BLD AUTO: 6.36 X10*6/UL (ref 4–5.2)
SODIUM SERPL-SCNC: 141 MMOL/L (ref 136–145)
TREPONEMA PALLIDUM IGG+IGM AB [PRESENCE] IN SERUM OR PLASMA BY IMMUNOASSAY: NONREACTIVE
TRIGL SERPL-MCNC: 115 MG/DL (ref 0–149)
VLDL: 23 MG/DL (ref 0–40)
WBC # BLD AUTO: 7 X10*3/UL (ref 4.4–11.3)

## 2024-11-22 PROCEDURE — 3080F DIAST BP >= 90 MM HG: CPT | Performed by: FAMILY MEDICINE

## 2024-11-22 PROCEDURE — 87491 CHLMYD TRACH DNA AMP PROBE: CPT | Performed by: FAMILY MEDICINE

## 2024-11-22 PROCEDURE — 80053 COMPREHEN METABOLIC PANEL: CPT | Performed by: FAMILY MEDICINE

## 2024-11-22 PROCEDURE — 87900 PHENOTYPE INFECT AGENT DRUG: CPT | Performed by: FAMILY MEDICINE

## 2024-11-22 PROCEDURE — 99214 OFFICE O/P EST MOD 30 MIN: CPT | Performed by: FAMILY MEDICINE

## 2024-11-22 PROCEDURE — 85025 COMPLETE CBC W/AUTO DIFF WBC: CPT | Performed by: FAMILY MEDICINE

## 2024-11-22 PROCEDURE — 86780 TREPONEMA PALLIDUM: CPT | Performed by: FAMILY MEDICINE

## 2024-11-22 PROCEDURE — 87536 HIV-1 QUANT&REVRSE TRNSCRPJ: CPT | Performed by: FAMILY MEDICINE

## 2024-11-22 PROCEDURE — 80061 LIPID PANEL: CPT | Performed by: FAMILY MEDICINE

## 2024-11-22 PROCEDURE — 3077F SYST BP >= 140 MM HG: CPT | Performed by: FAMILY MEDICINE

## 2024-11-22 PROCEDURE — 36415 COLL VENOUS BLD VENIPUNCTURE: CPT | Performed by: FAMILY MEDICINE

## 2024-11-22 RX ORDER — HYDROCHLOROTHIAZIDE 25 MG/1
25 TABLET ORAL DAILY
Qty: 30 TABLET | Refills: 5 | Status: SHIPPED | OUTPATIENT
Start: 2024-11-22 | End: 2025-11-22

## 2024-11-22 RX ORDER — SERTRALINE HYDROCHLORIDE 50 MG/1
TABLET, FILM COATED ORAL
Qty: 60 TABLET | Refills: 2 | Status: SHIPPED | OUTPATIENT
Start: 2024-11-22

## 2024-11-22 RX ORDER — EMTRICITABINE, RILPIVIRINE HYDROCHLORIDE, AND TENOFOVIR ALAFENAMIDE 200; 25; 25 MG/1; MG/1; MG/1
1 TABLET ORAL DAILY
Qty: 30 TABLET | Refills: 5 | Status: SHIPPED | OUTPATIENT
Start: 2024-11-22

## 2024-11-22 RX ORDER — AMLODIPINE BESYLATE 10 MG/1
10 TABLET ORAL DAILY
Qty: 30 TABLET | Refills: 5 | Status: SHIPPED | OUTPATIENT
Start: 2024-11-22 | End: 2025-11-22

## 2024-11-22 RX ORDER — NALTREXONE HYDROCHLORIDE 50 MG/1
50 TABLET, FILM COATED ORAL DAILY
Qty: 30 TABLET | Refills: 5 | Status: SHIPPED | OUTPATIENT
Start: 2024-11-22 | End: 2025-11-22

## 2024-11-22 NOTE — PROGRESS NOTES
Subjective   Patient ID: Shaista Smith is a 54 y.o. who presents for No chief complaint on file..  HPI    HIV  - Diagnosed in 2009  - Previously took Atripla, then was switched to Odefsey. Was trialed on Biktarvy in 2019 briefly but did not tolerate due to headache  - Last seen in April 2024, has been taking medications every few days since then. In April CD4 was 209, viral load was 74  - Interested in Cabenuva     Depression  - No SI/HI  - Previous zoloft - tolerated well but has been off this  - No current counseling, would be interested  - Has a psychiatrist appointment next month    Alcohol use  - Has history of heavy alcohol use, previously involved in programs and AA- continues to be involved in AA. Was drinking 4 doubles every day, has since cut down to 2 a day    HTN  - Previously took hydrochlorothiazide and amlodipine  - No headaches, chest pain, shortness of breath, vision changes, dizziness    Needs mammogram, pap  No recent sexual activity    Review of Systems  10 point review of systems negative except as noted in HPI.      Objective     BP (!) 176/98 (BP Location: Right arm)   Pulse 55   Wt 103 kg (227 lb 9.6 oz)   SpO2 100%   BMI 36.74 kg/m²   General: well appearing, no distress  Neck: No lymphadenopathy, no thyromegaly  CV: Regular rate and rhythm, no murmur  Lungs: Clear to auscultation bilaterally  Abdomen: Soft, nontender, nondistended  Extremities: No edema noted  Psych: Appropriate mood and affect     Assessment/Plan   53 yo here to establish care with me, previously seen by Dr. Gallegos     #HIV  - Check labs as ordered, including genotype  - Discussed criteria for initiating Cabenuva  - Counseled on compliance    #Depression  - Resume zoloft. Will start at 50 mg daily and increase to 100 mg   - Link with counseling resources    #Alcohol use  - Discussed treatment options. Will continue to work with AA. Rx for naltrexone     #HTN  - Resume hydrochlorothiazide and amlodipine. Encouraged  checking BP at home    Rx for mammogram, link with gynecology for pap  RTC 1 month or sooner as needed

## 2024-11-22 NOTE — PROGRESS NOTES
Rasta meets with Pt at MD appt. Pt transferring care from Dr. Gallegos to Dr. Ramsey d/t work schedule. Pt requests counseling referrals - does not want to pursue dual Dx care at this time. Rasta provides Pt with information for [facility]. Pt also reports having some issues with her meds. Rasta calls pharmacy and is advised that Pt picked up meds on 10/25 - $25 copay, but no refills. Rasta provides Pt with copay card, and refills sent by MD today. Rasta encourages Pt to reach out if she has any other issues.

## 2024-11-23 LAB
C TRACH RRNA SPEC QL NAA+PROBE: NEGATIVE
N GONORRHOEA DNA SPEC QL PROBE+SIG AMP: NEGATIVE

## 2024-11-24 LAB
CD3+CD4+ CELLS # BLD: 0.2 X10E9/L
CD3+CD4+ CELLS # BLD: 198 /MM3
CD3+CD4+ CELLS NFR BLD: 19 %
CD3+CD4+ CELLS/CD3+CD8+ CLL BLD: 0.63 %
CD3+CD8+ CELLS # BLD: 0.31 X10E9/L
CD3+CD8+ CELLS NFR BLD: 30 %
FLOW CYTOMETRY SPECIALIST REVIEW: ABNORMAL
HIV1 RNA # PLAS NAA DL=20: NOT DETECTED {COPIES}/ML
HIV1 RNA SPEC NAA+PROBE-LOG#: NORMAL {LOG_COPIES}/ML
LYMPHOCYTES # SPEC AUTO: 1.04 X10*3/UL

## 2024-12-06 LAB — HIV 1 GENOTYPE: NOT DETECTED

## 2024-12-13 ENCOUNTER — APPOINTMENT (OUTPATIENT)
Dept: RADIOLOGY | Facility: HOSPITAL | Age: 54
End: 2024-12-13
Payer: COMMERCIAL

## 2024-12-20 ENCOUNTER — OFFICE VISIT (OUTPATIENT)
Dept: IMMUNOLOGY | Facility: CLINIC | Age: 54
End: 2024-12-20
Payer: COMMERCIAL

## 2024-12-20 VITALS
BODY MASS INDEX: 36.16 KG/M2 | WEIGHT: 225 LBS | HEART RATE: 62 BPM | DIASTOLIC BLOOD PRESSURE: 119 MMHG | SYSTOLIC BLOOD PRESSURE: 159 MMHG | HEIGHT: 66 IN | OXYGEN SATURATION: 99 % | TEMPERATURE: 98.3 F | RESPIRATION RATE: 18 BRPM

## 2024-12-20 DIAGNOSIS — I10 HYPERTENSION, UNSPECIFIED TYPE: Primary | ICD-10-CM

## 2024-12-20 DIAGNOSIS — B20 AIDS (MULTI): ICD-10-CM

## 2024-12-20 DIAGNOSIS — F10.20 ALCOHOL USE DISORDER, SEVERE, DEPENDENCE (MULTI): ICD-10-CM

## 2024-12-20 PROCEDURE — 99214 OFFICE O/P EST MOD 30 MIN: CPT | Performed by: FAMILY MEDICINE

## 2024-12-20 PROCEDURE — 3077F SYST BP >= 140 MM HG: CPT | Performed by: FAMILY MEDICINE

## 2024-12-20 PROCEDURE — 3080F DIAST BP >= 90 MM HG: CPT | Performed by: FAMILY MEDICINE

## 2024-12-20 PROCEDURE — 36415 COLL VENOUS BLD VENIPUNCTURE: CPT | Performed by: FAMILY MEDICINE

## 2024-12-20 PROCEDURE — 87536 HIV-1 QUANT&REVRSE TRNSCRPJ: CPT | Performed by: FAMILY MEDICINE

## 2024-12-20 PROCEDURE — 3008F BODY MASS INDEX DOCD: CPT | Performed by: FAMILY MEDICINE

## 2024-12-20 ASSESSMENT — PAIN SCALES - GENERAL: PAINLEVEL_OUTOF10: 0-NO PAIN

## 2024-12-20 NOTE — PROGRESS NOTES
"Subjective   Patient ID: Shaista Smith is a 54 y.o. who presents for follow-up  HPI    HIV  - Diagnosed in 2009  - Previously took Atripla, then was switched to Odefsey. Was trialed on Biktarvy in 2019 briefly but did not tolerate due to headache  - Last visit reported taking medication intermittently. Goal was to start Cabenuva. Labs last time with VL undectable, CD4 198    Depression  - No SI/HI  -Last visit we resumed zoloft, linked with counseling resources. Has noticed some improvement with zoloft. Taking 100 mg daily.  - Has appointment with psychiatry scheduled on 12/27    Alcohol use  - Has history of heavy alcohol use, previously involved in programs and AA- continues to be involved in AA.   - Last visit resumed naltrexone - not taking it consistently   - At least 2 doubles a night, which is increased from prior     HTN  - Last visit resumed hydrochlorothiazide and amlodipine. Hasn't been taking it because it makes her urinate  - Previous lisinopril- didn't tolerate   - No headaches, chest pain, shortness of breath, vision changes, dizziness    Last visit rec was given for mammogram, patient was linked with gynecology for pap    Review of Systems  10 point review of systems negative except as noted in HPI.      Objective     BP (!) 159/119 (BP Location: Right arm, Patient Position: Sitting, BP Cuff Size: Large adult)   Pulse 62   Temp 36.8 °C (98.3 °F) (Skin)   Resp 18   Ht 1.676 m (5' 6\")   Wt 102 kg (225 lb)   SpO2 99%   BMI 36.32 kg/m²   General: well appearing, no distress  Neck: No lymphadenopathy, no thyromegaly  CV: Regular rate and rhythm, no murmur  Lungs: Clear to auscultation bilaterally  Abdomen: Soft, nontender, nondistended  Extremities: No edema noted  Psych: Appropriate mood and affect     Assessment/Plan   55 yo here to establish care with me, previously seen by Dr. Gallegos     #HIV  - Repeat VL, continue Odefsey in the meantime    #Depression  - Continue zoloft, follow-up behavioral " health team    #Alcohol use  - Counseled on cessation and resources, encouraged taking naltrexone     #HTN  - Resume amlodipine. Consider adding losartan. She will check BP at home daily and we will speak in 3 weeks about BP readings.     RTC 3 weeks virtual visit

## 2024-12-22 LAB
HIV1 RNA # PLAS NAA DL=20: NOT DETECTED {COPIES}/ML
HIV1 RNA SPEC NAA+PROBE-LOG#: NORMAL {LOG_COPIES}/ML

## 2024-12-27 ENCOUNTER — APPOINTMENT (OUTPATIENT)
Dept: BEHAVIORAL HEALTH | Facility: CLINIC | Age: 54
End: 2024-12-27
Payer: COMMERCIAL

## 2024-12-27 DIAGNOSIS — F33.1 MAJOR DEPRESSIVE DISORDER, RECURRENT EPISODE, MODERATE: ICD-10-CM

## 2024-12-27 DIAGNOSIS — F41.9 ANXIETY: ICD-10-CM

## 2024-12-27 DIAGNOSIS — F10.90 ALCOHOL USE DISORDER: ICD-10-CM

## 2024-12-27 RX ORDER — HYDROXYZINE HYDROCHLORIDE 10 MG/1
10 TABLET, FILM COATED ORAL 3 TIMES DAILY PRN
Qty: 90 TABLET | Refills: 0 | Status: SHIPPED | OUTPATIENT
Start: 2024-12-27 | End: 2025-01-26

## 2024-12-27 ASSESSMENT — ANXIETY QUESTIONNAIRES
6. BECOMING EASILY ANNOYED OR IRRITABLE: NEARLY EVERY DAY
4. TROUBLE RELAXING: NEARLY EVERY DAY
IF YOU CHECKED OFF ANY PROBLEMS ON THIS QUESTIONNAIRE, HOW DIFFICULT HAVE THESE PROBLEMS MADE IT FOR YOU TO DO YOUR WORK, TAKE CARE OF THINGS AT HOME, OR GET ALONG WITH OTHER PEOPLE: VERY DIFFICULT
1. FEELING NERVOUS, ANXIOUS, OR ON EDGE: NEARLY EVERY DAY
7. FEELING AFRAID AS IF SOMETHING AWFUL MIGHT HAPPEN: NOT AT ALL
3. WORRYING TOO MUCH ABOUT DIFFERENT THINGS: MORE THAN HALF THE DAYS
GAD7 TOTAL SCORE: 13
5. BEING SO RESTLESS THAT IT IS HARD TO SIT STILL: NOT AT ALL
2. NOT BEING ABLE TO STOP OR CONTROL WORRYING: MORE THAN HALF THE DAYS

## 2024-12-27 ASSESSMENT — PATIENT HEALTH QUESTIONNAIRE - PHQ9
5. POOR APPETITE OR OVEREATING: MORE THAN HALF THE DAYS
4. FEELING TIRED OR HAVING LITTLE ENERGY: NEARLY EVERY DAY
8. MOVING OR SPEAKING SO SLOWLY THAT OTHER PEOPLE COULD HAVE NOTICED. OR THE OPPOSITE, BEING SO FIGETY OR RESTLESS THAT YOU HAVE BEEN MOVING AROUND A LOT MORE THAN USUAL: NOT AT ALL
SUM OF ALL RESPONSES TO PHQ9 QUESTIONS 1 & 2: 3
2. FEELING DOWN, DEPRESSED OR HOPELESS: MORE THAN HALF THE DAYS
9. THOUGHTS THAT YOU WOULD BE BETTER OFF DEAD, OR OF HURTING YOURSELF: NOT AT ALL
3. TROUBLE FALLING OR STAYING ASLEEP: MORE THAN HALF THE DAYS
6. FEELING BAD ABOUT YOURSELF - OR THAT YOU ARE A FAILURE OR HAVE LET YOURSELF OR YOUR FAMILY DOWN: MORE THAN HALF THE DAYS
SUM OF ALL RESPONSES TO PHQ QUESTIONS 1-9: 13
7. TROUBLE CONCENTRATING ON THINGS, SUCH AS READING THE NEWSPAPER OR WATCHING TELEVISION: SEVERAL DAYS
1. LITTLE INTEREST OR PLEASURE IN DOING THINGS: SEVERAL DAYS
10. IF YOU CHECKED OFF ANY PROBLEMS, HOW DIFFICULT HAVE THESE PROBLEMS MADE IT FOR YOU TO DO YOUR WORK, TAKE CARE OF THINGS AT HOME, OR GET ALONG WITH OTHER PEOPLE: SOMEWHAT DIFFICULT
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
## 2025-01-09 ENCOUNTER — DOCUMENTATION (OUTPATIENT)
Dept: IMMUNOLOGY | Facility: CLINIC | Age: 55
End: 2025-01-09
Payer: COMMERCIAL

## 2025-01-09 NOTE — PROGRESS NOTES
Pt called c/o a sore throat for 2 days.  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  Message was sent to Dr. Ramsey.  Nurse then realized pt has a scheduled virtual visit with Dr. Ramsey in the am.  Called pt and let her know they can discuss the need for an antibiotic in the morning.

## 2025-01-10 ENCOUNTER — TELEMEDICINE (OUTPATIENT)
Dept: IMMUNOLOGY | Facility: CLINIC | Age: 55
End: 2025-01-10
Payer: COMMERCIAL

## 2025-01-10 DIAGNOSIS — B20 HUMAN IMMUNODEFICIENCY VIRUS (HIV) DISEASE (MULTI): Primary | ICD-10-CM

## 2025-01-10 DIAGNOSIS — R05.2 SUBACUTE COUGH: ICD-10-CM

## 2025-01-10 DIAGNOSIS — I10 HYPERTENSION, UNSPECIFIED TYPE: ICD-10-CM

## 2025-01-10 PROCEDURE — 99214 OFFICE O/P EST MOD 30 MIN: CPT | Performed by: FAMILY MEDICINE

## 2025-01-10 RX ORDER — LOSARTAN POTASSIUM 50 MG/1
50 TABLET ORAL DAILY
Qty: 30 TABLET | Refills: 3 | Status: SHIPPED | OUTPATIENT
Start: 2025-01-10 | End: 2026-02-14

## 2025-01-10 NOTE — PROGRESS NOTES
"Subjective   Patient ID: Shaista Smith is a 54 y.o. who presents for follow-up via phone visit  HPI    URI  - Has a cough- started \"a while ago\", unsure of exact timing   - Sore throat yesterday, now resolved  - Has tried taking inhaler, which helps sometimes  - Sometimes runny nose  - No shortness of breath  - No fevers    HIV  - Diagnosed in 2009  - Previously took Atripla, then was switched to Odefsey. Was trialed on Biktarvy in 2019 briefly but did not tolerate due to headache  - Last visit reported taking medication intermittently. Goal is to start Cabenuva. VL undetectable     Depression  - No SI/HI. Taking zoloft and hydroxyzine PRN     Alcohol use  - Has history of heavy alcohol use, previously involved in programs and AA- continues to be involved in AA.   - At least 2 doubles a night, stable from prior  - Declines further assistance    HTN  - Last visit resumed hydrochlorothiazide and amlodipine. Hasn't been taking it because it makes her urinate  - Was taking just the amlodipine, but was making her urinate too much (?). Has stopped taking it  - Unsure of home Bps but says they are \"high  - Previous lisinopril- didn't tolerate   - No headaches, chest pain, shortness of breath, vision changes, dizziness    Review of Systems  10 point review of systems negative except as noted in HPI.      Objective     There were no vitals taken for this visit.  No acute distress. Speaking in full sentences without respiratory distress. Remainder of exam deferred      Assessment/Plan   55 yo seen via phone visit for follow-up    #Cough  - Suspect URI, however unable to exam due to nature of phone visit. If symptoms worsen or fail to improve she will come for in person evaluation     #HIV  - Continue Odefsey, repeat VL at next appointment     #HTN  - Resume amlodipine, add losartan. Check BP at home. Encouraged her to reach out if she continues to have issues taking these medications. Stressed the importance of good BP " control    RTC 1 month

## 2025-01-24 ENCOUNTER — APPOINTMENT (OUTPATIENT)
Dept: BEHAVIORAL HEALTH | Facility: CLINIC | Age: 55
End: 2025-01-24
Payer: COMMERCIAL

## 2025-01-24 DIAGNOSIS — F33.1 MAJOR DEPRESSIVE DISORDER, RECURRENT EPISODE, MODERATE: ICD-10-CM

## 2025-01-24 DIAGNOSIS — F41.9 ANXIETY: ICD-10-CM

## 2025-01-24 DIAGNOSIS — F10.90 ALCOHOL USE DISORDER: ICD-10-CM

## 2025-01-24 PROCEDURE — 99214 OFFICE O/P EST MOD 30 MIN: CPT | Performed by: NURSE PRACTITIONER
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
## 2025-01-26 DIAGNOSIS — F41.9 ANXIETY: ICD-10-CM

## 2025-01-27 RX ORDER — HYDROXYZINE HYDROCHLORIDE 10 MG/1
10 TABLET, FILM COATED ORAL 3 TIMES DAILY PRN
Qty: 90 TABLET | Refills: 0 | OUTPATIENT
Start: 2025-01-27 | End: 2025-02-26

## 2025-02-14 ENCOUNTER — APPOINTMENT (OUTPATIENT)
Dept: IMMUNOLOGY | Facility: CLINIC | Age: 55
End: 2025-02-14
Payer: COMMERCIAL

## 2025-02-21 ENCOUNTER — APPOINTMENT (OUTPATIENT)
Dept: BEHAVIORAL HEALTH | Facility: CLINIC | Age: 55
End: 2025-02-21
Payer: COMMERCIAL

## 2025-02-21 DIAGNOSIS — F41.9 ANXIETY: ICD-10-CM

## 2025-02-21 DIAGNOSIS — F33.1 MAJOR DEPRESSIVE DISORDER, RECURRENT EPISODE, MODERATE: ICD-10-CM

## 2025-02-21 PROCEDURE — 99214 OFFICE O/P EST MOD 30 MIN: CPT | Performed by: NURSE PRACTITIONER

## 2025-02-21 RX ORDER — SERTRALINE HYDROCHLORIDE 100 MG/1
150 TABLET, FILM COATED ORAL DAILY
Qty: 135 TABLET | Refills: 0 | Status: SHIPPED | OUTPATIENT
Start: 2025-02-21 | End: 2025-05-22

## 2025-02-21 RX ORDER — HYDROXYZINE HYDROCHLORIDE 10 MG/1
10 TABLET, FILM COATED ORAL 3 TIMES DAILY PRN
Qty: 90 TABLET | Refills: 2 | Status: SHIPPED | OUTPATIENT
Start: 2025-02-21 | End: 2025-05-22
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
## 2025-03-11 ENCOUNTER — TELEPHONE (OUTPATIENT)
Dept: IMMUNOLOGY | Facility: CLINIC | Age: 55
End: 2025-03-11
Payer: COMMERCIAL

## 2025-03-11 NOTE — TELEPHONE ENCOUNTER
RN forwards message that Pt having issue with cost of ART. Sw had previously provided Pt with co-pay card in 11/2024. Sw calls [facility] - no co-pay card on file. [facility] provides pharmacist with [facility] card information and Rx processed for $0. [facility]asta calls Pt and advises her of same. Pt to contact Sw if any other issues or barriers come up. Sw to follow PRN.

## 2025-03-21 ENCOUNTER — APPOINTMENT (OUTPATIENT)
Dept: BEHAVIORAL HEALTH | Facility: CLINIC | Age: 55
End: 2025-03-21
Payer: COMMERCIAL

## 2025-03-21 DIAGNOSIS — F41.9 ANXIETY: ICD-10-CM

## 2025-03-21 DIAGNOSIS — F33.1 MAJOR DEPRESSIVE DISORDER, RECURRENT EPISODE, MODERATE: ICD-10-CM

## 2025-03-21 PROCEDURE — 99214 OFFICE O/P EST MOD 30 MIN: CPT | Performed by: NURSE PRACTITIONER
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
## 2025-04-21 ENCOUNTER — APPOINTMENT (OUTPATIENT)
Dept: BEHAVIORAL HEALTH | Facility: CLINIC | Age: 55
End: 2025-04-21
Payer: COMMERCIAL

## 2025-05-28 DIAGNOSIS — F33.1 MAJOR DEPRESSIVE DISORDER, RECURRENT EPISODE, MODERATE: ICD-10-CM

## 2025-05-28 DIAGNOSIS — F41.9 ANXIETY: ICD-10-CM

## 2025-05-28 RX ORDER — SERTRALINE HYDROCHLORIDE 100 MG/1
15 TABLET, FILM COATED ORAL DAILY
Qty: 135 TABLET | Refills: 0 | OUTPATIENT
Start: 2025-05-28

## 2025-06-02 ENCOUNTER — APPOINTMENT (OUTPATIENT)
Dept: BEHAVIORAL HEALTH | Facility: CLINIC | Age: 55
End: 2025-06-02
Payer: COMMERCIAL

## 2025-06-02 DIAGNOSIS — F41.9 ANXIETY: ICD-10-CM

## 2025-06-02 DIAGNOSIS — F33.1 MAJOR DEPRESSIVE DISORDER, RECURRENT EPISODE, MODERATE: ICD-10-CM

## 2025-06-02 PROCEDURE — 99214 OFFICE O/P EST MOD 30 MIN: CPT | Performed by: NURSE PRACTITIONER

## 2025-06-02 RX ORDER — SERTRALINE HYDROCHLORIDE 100 MG/1
200 TABLET, FILM COATED ORAL DAILY
Qty: 180 TABLET | Refills: 3 | Status: SHIPPED | OUTPATIENT
Start: 2025-06-02 | End: 2026-06-02

## 2025-06-02 RX ORDER — HYDROXYZINE HYDROCHLORIDE 10 MG/1
10 TABLET, FILM COATED ORAL 3 TIMES DAILY PRN
Qty: 90 TABLET | Refills: 2 | Status: SHIPPED | OUTPATIENT
Start: 2025-06-02 | End: 2025-08-31
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
## 2025-06-03 NOTE — ADDENDUM NOTE
Addended by: DEEPTHI THOMPSON on: 11/2/2023 03:20 PM     Modules accepted: Level of Service    
discharged by LAURIE Lucas

## 2025-06-11 ENCOUNTER — TELEPHONE (OUTPATIENT)
Dept: CARE COORDINATION | Age: 55
End: 2025-06-11
Payer: COMMERCIAL

## 2025-06-11 ENCOUNTER — PATIENT OUTREACH (OUTPATIENT)
Dept: CARE COORDINATION | Age: 55
End: 2025-06-11
Payer: COMMERCIAL

## 2025-06-11 DIAGNOSIS — I10 HYPERTENSION, UNSPECIFIED TYPE: ICD-10-CM

## 2025-06-11 NOTE — PROGRESS NOTES
Patient returned call to enroll in remote patient monitoring program. Program expectations explained and reviewed; patient verbalized understanding.  Verified patient address and contact preferences.  Scheduled equipment delivery for: 6/12/25.  Placed on EMS schedule.

## 2025-06-12 ENCOUNTER — PATIENT OUTREACH (OUTPATIENT)
Dept: CARE COORDINATION | Age: 55
End: 2025-06-12
Payer: COMMERCIAL

## 2025-06-12 NOTE — PROGRESS NOTES
Call placed to patient to enroll in remote patient monitoring program. Program expectations explained and reviewed; patient verbalized understanding.  Scheduled equipment delivery for: delivered 6/12/25, call schedule created. [facility] SENT.

## 2025-06-17 ENCOUNTER — PATIENT OUTREACH (OUTPATIENT)
Dept: CARE COORDINATION | Age: 55
End: 2025-06-17
Payer: COMMERCIAL

## 2025-06-17 VITALS
DIASTOLIC BLOOD PRESSURE: 99 MMHG | SYSTOLIC BLOOD PRESSURE: 144 MMHG | HEART RATE: 89 BPM | OXYGEN SATURATION: 99 % | BODY MASS INDEX: 39.22 KG/M2 | WEIGHT: 243 LBS

## 2025-06-17 DIAGNOSIS — I10 HYPERTENSION, UNSPECIFIED TYPE: ICD-10-CM

## 2025-06-17 RX ORDER — HYDROCHLOROTHIAZIDE 25 MG/1
25 TABLET ORAL DAILY
Qty: 90 TABLET | Refills: 3 | Status: SHIPPED | OUTPATIENT
Start: 2025-06-17 | End: 2026-06-17

## 2025-06-17 NOTE — PROGRESS NOTES
Spoke to patient. Stated she had not taken her BP medications yesterday as she had run out of them. She states she picked up her prescription for her BP meds this am and has now taken them. BP remains elevated. Pt does not have a PCP. Told her to retake BP in one hour, if still elevated to go to ER. I will also put in referral for new PCP.

## 2025-06-18 ENCOUNTER — PATIENT OUTREACH (OUTPATIENT)
Dept: CARE COORDINATION | Age: 55
End: 2025-06-18
Payer: COMMERCIAL

## 2025-06-18 ENCOUNTER — APPOINTMENT (OUTPATIENT)
Dept: BEHAVIORAL HEALTH | Facility: CLINIC | Age: 55
End: 2025-06-18
Payer: COMMERCIAL

## 2025-06-18 ENCOUNTER — APPOINTMENT (OUTPATIENT)
Dept: RADIOLOGY | Facility: HOSPITAL | Age: 55
End: 2025-06-18
Payer: COMMERCIAL

## 2025-06-18 ENCOUNTER — HOSPITAL ENCOUNTER (EMERGENCY)
Facility: HOSPITAL | Age: 55
Discharge: HOME | End: 2025-06-18
Payer: COMMERCIAL

## 2025-06-18 VITALS
DIASTOLIC BLOOD PRESSURE: 95 MMHG | OXYGEN SATURATION: 97 % | HEIGHT: 65 IN | RESPIRATION RATE: 17 BRPM | TEMPERATURE: 98.6 F | SYSTOLIC BLOOD PRESSURE: 157 MMHG | BODY MASS INDEX: 40.15 KG/M2 | WEIGHT: 241 LBS

## 2025-06-18 DIAGNOSIS — R03.0 ELEVATED BLOOD PRESSURE READING: Primary | ICD-10-CM

## 2025-06-18 DIAGNOSIS — R05.1 ACUTE COUGH: ICD-10-CM

## 2025-06-18 PROCEDURE — 99283 EMERGENCY DEPT VISIT LOW MDM: CPT | Mod: 25

## 2025-06-18 PROCEDURE — 71046 X-RAY EXAM CHEST 2 VIEWS: CPT | Performed by: STUDENT IN AN ORGANIZED HEALTH CARE EDUCATION/TRAINING PROGRAM

## 2025-06-18 PROCEDURE — 71046 X-RAY EXAM CHEST 2 VIEWS: CPT

## 2025-06-18 RX ORDER — BENZONATATE 100 MG/1
100 CAPSULE ORAL 3 TIMES DAILY PRN
Qty: 21 CAPSULE | Refills: 0 | Status: SHIPPED | OUTPATIENT
Start: 2025-06-18 | End: 2025-06-25

## 2025-06-18 ASSESSMENT — PAIN - FUNCTIONAL ASSESSMENT: PAIN_FUNCTIONAL_ASSESSMENT: 0-10

## 2025-06-18 ASSESSMENT — PAIN SCALES - GENERAL: PAINLEVEL_OUTOF10: 0 - NO PAIN

## 2025-06-18 ASSESSMENT — ENCOUNTER SYMPTOMS: COUGH: 1

## 2025-06-18 NOTE — ED PROVIDER NOTES
Emergency Department Encounter  [facility] EMERGENCY MEDICINE    Patient: Shaista Smith  MRN: [id number]  : 1970  Date of Evaluation: 2025  ED Provider: JAREK Sousa      Chief Complaint       Chief Complaint   Patient presents with    Hypertension        Limitations to History: none  Historian: patient  Records reviewed: EMR inpatient and outpatient notes, Care Everywhere    This is a 55-year-old female with a PMH of HIV and hypertension who presents to the emergency room for her blood pressure.  Patient states that she was previously out of her blood pressure medications for couple of months because she ran out.  Patient states that she resumed taking her blood pressure medications on Tuesday.  Patient reports that she was reporting to her nurse and did inform the nurse that her blood pressure was significantly elevated to 180/120's.  Patient was advised to come to the emergency room for further evaluation.  Denies any chest pain, shortness of breath or headaches.  Patient does endorse a productive cough for couple of weeks without any fevers or chills.  Denies taking any medications over the counter for symptoms. Denies any known sick contacts.  Patient is currently taking amlodipine and hydrochlorothiazide for hypertension.    PMH: HIV, HTN  PSH: Right breast cyst removal  Allergies: Reviewed per EMR  Social HX: Denies smoking, alcohol or drug use.  Family HX: No family history pertinent to current presenting problem      ROS:     Review of Systems   Respiratory:  Positive for cough.      14 systems reviewed and otherwise acutely negative except as in the [place].        Past History   Medical History[1]  Surgical History[2]      Medications/Allergies     Previous Medications    ALBUTEROL (VENTOLIN HFA) 90 MCG/ACTUATION INHALER    Inhale 2 puffs. Every 4-6 hours as needed    AMLODIPINE (NORVASC) 10 MG TABLET    TAKE 1 TABLET BY MOUTH ONCE DAILY    CHOLECALCIFEROL (VITAMIN D-3)  50 MCG (2,000 UNIT) CAPSULE    Take 2 capsules (100 mcg) by mouth once daily.    FAMOTIDINE (PEPCID) 20 MG TABLET    Take 1 tablet (20 mg) by mouth once daily in the morning.    FERROUS SULFATE 325 (65 FE) MG TABLET    Take 1 tablet (325 mg) by mouth 3 times daily (morning, midday, late afternoon).    GABAPENTIN (NEURONTIN) 100 MG CAPSULE    Take 1 capsule (100 mg) by mouth 3 times a day.    HYDROCHLOROTHIAZIDE (HYDRODIURIL) 25 MG TABLET    TAKE 1 TABLET BY MOUTH ONCE DAILY    HYDROXYZINE HCL (ATARAX) 10 MG TABLET    Take 1 tablet (10 mg) by mouth 3 times a day as needed for anxiety.    LOSARTAN (COZAAR) 50 MG TABLET    Take 1 tablet (50 mg) by mouth once daily.    NALTREXONE (DEPADE) 50 MG TABLET    Take 1 tablet (50 mg) by mouth once daily.    NAPROXEN (NAPROSYN) 500 MG TABLET    Take 1 tablet (500 mg) by mouth 2 times a day as needed (pain).    ODEFSEY 200-25-25 MG TABLET    Take 1 tablet by mouth once daily. With food    SERTRALINE (ZOLOFT) 100 MG TABLET    Take 2 tablets (200 mg) by mouth once daily.     Allergies[3]     Physical Exam       ED Triage Vitals [06/18/25 1647]   Temperature Pulse Respirations BP   37 °C (98.6 °F) -- 17 (!) 157/95      Pulse Ox Temp src Heart Rate Source Patient Position   97 % -- -- --      BP Location FiO2 (%)     -- --       Physical Exam:    Appearance: Alert, oriented , cooperative,  in no acute distress. Well nourished & well hydrated.    Skin: Intact,  dry skin, no lesions, rash, petechiae or purpura.     Eyes: PERRLA, EOMs intact.    ENT: Hearing grossly intact. External auditory canals patent, tympanic membranes intact with visible landmarks. Nares patent, mucus membranes moist. Dentition without lesions. Pharynx clear, uvula midline.     Neck: Supple, without meningismus.    Pulmonary: Clear bilaterally with good chest wall excursion. No rales, rhonchi or wheezing. No accessory muscle use or stridor.    Cardiac: Normal S1, S2 without murmur, rub, gallop or extrasystole. No  "JVD, Carotids without bruits.    Abdomen: Soft, nontender, active bowel sounds.  No palpable organomegaly.  No rebound or guarding.      Musculoskeletal: Full range of motion. no pain, edema, or deformity. Pulses full and equal. No cyanosis, clubbing, or edema.    Neurological:  Normal sensation, no weakness, no focal findings identified.    Psychiatric: Appropriate mood and affect.       Diagnostics   Labs:  No results found. However, due to the size of the patient record, not all encounters were searched. Please check Results Review for a complete set of results.   Radiographs:  XR chest 2 views   Final Result   1.  No evidence of acute cardiopulmonary process.        I personally reviewed the images/study and I agree with the findings   as stated by resident Edward Ramos. This study was interpreted   at [place], [place].        MACRO:   None        Signed by: Clement Vela 6/18/2025 5:23 PM   Dictation workstation:   [id number]            Assessment   In brief, Shaista Smith is a 55 y.o. female who presented to the emergency department for elevated blood pressure readings.        ED Course/MDM     Diagnoses as of 06/18/25 1735   Elevated blood pressure reading   Acute cough      Visit Vitals  BP (!) 157/95   Temp 37 °C (98.6 °F)   Resp 17   Ht 1.651 m (5' 5\")   Wt 109 kg (241 lb)   SpO2 97%   BMI 40.10 kg/m²   Smoking Status Former   BSA 2.24 m²       Medications - No data to display    Patient remained stable while in the emergency department. Previous outpatient and ED records were reviewed. Outside records were reviewed.  Chest x-ray was obtained due to the cough which shows no evidence of acute cardiopulmonary process.  Lungs were clear and pulse ox was normal at 97%.  Blood pressure was 157/95 while in the emergency room today.  Patient started taking her blood pressure medications again yesterday.  Advised the patient that the blood pressure reading is " elevated although it may take a couple days for the medications to work.  Advised the patient to follow-up with her primary care doctor and return the emergency room with worsening symptoms.    Final Impression      1. Elevated blood pressure reading    2. Acute cough          DISPOSITION  Disposition: Discharged home    Comment: Please note this report has been produced using speech recognition software and may contain errors related to that system including errors in grammar, punctuation, and spelling, as well as words and phrases that may be inappropriate.  If there are any questions or concerns please feel free to contact the dictating provider for clarification.    Eva Sumner, APRN-CNP             [1]   Past Medical History:  Diagnosis Date    Abnormal cytological findings in specimens from other organs, systems and tissues     LSIL (low grade squamous intraepithelial lesion) on Pap smear    Abnormal weight loss 07/12/2016    Rapid weight loss    Acute candidiasis of vulva and vagina 10/03/2016    Yeast infection of the vagina    Anogenital (venereal) warts     Genital warts    Encounter for general adult medical examination without abnormal findings 09/28/2020    Encounter for preventive health examination    Encounter for screening for infections with a predominantly sexual mode of transmission 04/01/2019    Screening for STDs (sexually transmitted diseases)    Encounter for screening for malignant neoplasm of colon 08/31/2018    Screening for colon cancer    Food insecurity 01/05/2023    Food insecurity    Human immunodeficiency virus (HIV) disease (Multi)     HIV disease    Impacted cerumen, bilateral 08/31/2018    Bilateral impacted cerumen    Morbid (severe) obesity due to excess calories (Multi) 03/23/2015    Psychological factors affecting morbid obesity    Nausea 07/07/2015    Postoperative nausea    Other acute postprocedural pain 07/12/2016    Acute post-operative pain    Personal history of  other diseases of the musculoskeletal system and connective tissue     Personal history of arthritis    Personal history of other endocrine, nutritional and metabolic disease     History of type 2 diabetes mellitus    Personal history of other endocrine, nutritional and metabolic disease 07/12/2016    History of diabetes mellitus    Personal history of other infectious and parasitic diseases     History of trichomoniasis    Personal history of other infectious and parasitic diseases     History of gonorrhea    Personal history of other infectious and parasitic diseases 05/05/2015    History of Helicobacter infection    Personal history of other infectious and parasitic diseases 03/23/2017    History of trichomoniasis    Personal history of other infectious and parasitic diseases 02/02/2016    History of trichomoniasis    Personal history of other specified conditions 08/25/2015    History of wheezing    Personal history of urinary (tract) infections 02/26/2018    History of urinary tract infection    Personal history of urinary (tract) infections     Personal history of urinary tract infection    Personal history of urinary (tract) infections 08/31/2018    History of urinary tract infection    Unspecified abnormal findings in urine 10/03/2017    Abnormal urine    Urinary tract infection, site not specified     E. coli UTI    Urinary tract infection, site not specified 02/23/2018    Urinary tract infection with hematuria, site unspecified   [2]   Past Surgical History:  Procedure Laterality Date    BREAST BIOPSY  02/07/2014    Biopsy Breast Percutaneous Needle Core    COLPOSCOPY  02/07/2014    Colposcopy    OTHER SURGICAL HISTORY  02/07/2014    Uterine Fibroid Embolization    OTHER SURGICAL HISTORY  08/25/2015    Gastric Surgery For Morbid Obesity Laparoscopic Longitudinal Gastrectomy    TUBAL LIGATION  02/17/2015    Tubal Ligation   [3]   Allergies  Allergen Reactions    Dapsone Headache, Itching and Other     HA &  eyes become red    Sulfamethoxazole Headache and Itching        Eva Sumner, APRN-CNP  06/18/25 [id number]

## 2025-06-18 NOTE — PROGRESS NOTES
Another call attempted to patient to go to ER for elevated BP. Left another message on voicemail.

## 2025-06-18 NOTE — PROGRESS NOTES
6/18 spoke to pt, told her to go immediately to ER for elevated BP. Also gave her information for finding a PCP. Stated she did not want to go to ER but will go.

## 2025-06-18 NOTE — PROGRESS NOTES
BP remains elevated. Attempted call again to pt, No answer. Email sent to pt to get to the ER. Also given number to scheduling to schedule for new PCP appt. Will try to contact pt later today to see if received messages.

## 2025-06-18 NOTE — ED TRIAGE NOTES
Directed to come to ed for elevated BP by home health services. Per pt she had been out of meds for months and resumed taking them Tuesday. Denies CP and SOB.

## 2025-06-19 ENCOUNTER — PATIENT OUTREACH (OUTPATIENT)
Dept: CARE COORDINATION | Age: 55
End: 2025-06-19
Payer: COMMERCIAL

## 2025-06-19 ENCOUNTER — DOCUMENTATION (OUTPATIENT)
Dept: BEHAVIORAL HEALTH | Facility: CLINIC | Age: 55
End: 2025-06-19
Payer: COMMERCIAL

## 2025-06-19 VITALS
BODY MASS INDEX: 39.61 KG/M2 | HEART RATE: 67 BPM | OXYGEN SATURATION: 99 % | WEIGHT: 238 LBS | DIASTOLIC BLOOD PRESSURE: 93 MMHG | SYSTOLIC BLOOD PRESSURE: 129 MMHG

## 2025-06-19 NOTE — PROGRESS NOTES
Shaista Smith was scheduled for a Behavioral [facility] intake assessment on Wednesday, 6/18/25, at 2:00 PM via Zoom with this writer. Patient did not attend the scheduled appointment and did not provide notice of cancellation. The patient has access to the writer's email address, as well as the [facility] main scheduling line, should she wish to reschedule in the future.

## 2025-06-19 NOTE — PROGRESS NOTES
Pt went to ER on 6/18 for elevated BP readings. No changes, Pt had ran out of BP medications and resumed taking them on 6/17. ED NP advised to give it a few days for medications to take effect. Will continue to monitor.

## 2025-06-19 NOTE — PROGRESS NOTES
Week 1 RPM Patient Call  Spoke to patient, first call, reviewed providers, vaccination status, medication classification, and medical history. Reviewed program goals and set goals for 12 wk program, BP was elevated, was out of her BP medications. Now taking them and BP going down. Was seen in ER yesterday and told to allow medication to take effect. Has scheduled appt with new PCP. Will continue to monitor. Ave Cantor RN    Pt Education: Blood pressure and Medications  Barriers: n/a  Topics for Daily Review: n/a  Pt demonstrates clear understanding: Yes    VS Readings  Daily Weight:  There were no vitals filed for this visit.   Last 3 Weights:  Wt Readings from Last 7 Encounters:   06/18/25 109 kg (241 lb)   06/17/25 110 kg (243 lb)   06/18/25 109 kg (241 lb)   12/20/24 102 kg (225 lb)   11/22/24 103 kg (227 lb 9.6 oz)   04/25/24 105 kg (232 lb 6.4 oz)   07/20/23 106 kg (234 lb 3.2 oz)     Blood Pressure:  BP Readings from Last 3 Encounters:   06/18/25 (!) 157/95   06/17/25 (!) 144/99   06/18/25 (!) 169/116          Masimo Device: Yes   Jasiel Clinical Impression: MMO/RPM

## 2025-06-23 ENCOUNTER — PATIENT OUTREACH (OUTPATIENT)
Dept: CARE COORDINATION | Age: 55
End: 2025-06-23
Payer: COMMERCIAL

## 2025-06-23 NOTE — PROGRESS NOTES
Week 2 RPM Patient Call  Spoke to patient. Discussed cardiac circulation, disease processes, modification of lifestyle risk factors, athero/Arteriosclerosis treatments, and medications. Sent educational materials, for CAD/HTN/Blood thinner.    Pt Education: Blood pressure, Smoking Cessation, and Medications  Barriers: n/a  Topics for Daily Review: n/a  Pt demonstrates clear understanding: Yes    VS Readings  Daily Weight:  There were no vitals filed for this visit.   Last 3 Weights:  Wt Readings from Last 7 Encounters:   06/19/25 108 kg (238 lb)   06/18/25 109 kg (241 lb)   06/17/25 110 kg (243 lb)   06/22/25 108 kg (237 lb)   12/20/24 102 kg (225 lb)   11/22/24 103 kg (227 lb 9.6 oz)   04/25/24 105 kg (232 lb 6.4 oz)     Blood Pressure:  BP Readings from Last 3 Encounters:   06/19/25 (!) 129/93   06/18/25 (!) 157/95   06/17/25 (!) 144/99          Masimo Device: Yes   [place]io Clinical Impression: MMO/RPM

## 2025-06-25 ENCOUNTER — HOSPITAL ENCOUNTER (EMERGENCY)
Facility: HOSPITAL | Age: 55
Discharge: HOME | End: 2025-06-26
Payer: COMMERCIAL

## 2025-06-25 DIAGNOSIS — I10 HYPERTENSION, UNSPECIFIED TYPE: Primary | ICD-10-CM

## 2025-06-25 PROCEDURE — 93010 ELECTROCARDIOGRAM REPORT: CPT | Performed by: PHYSICIAN ASSISTANT

## 2025-06-25 PROCEDURE — 99284 EMERGENCY DEPT VISIT MOD MDM: CPT

## 2025-06-25 PROCEDURE — 99284 EMERGENCY DEPT VISIT MOD MDM: CPT | Performed by: PHYSICIAN ASSISTANT

## 2025-06-25 RX ORDER — ACETAMINOPHEN 325 MG/1
975 TABLET ORAL ONCE
Status: COMPLETED | OUTPATIENT
Start: 2025-06-26 | End: 2025-06-26

## 2025-06-25 ASSESSMENT — LIFESTYLE VARIABLES
HAVE PEOPLE ANNOYED YOU BY CRITICIZING YOUR DRINKING: NO
TOTAL SCORE: 0
HAVE YOU EVER FELT YOU SHOULD CUT DOWN ON YOUR DRINKING: NO
EVER HAD A DRINK FIRST THING IN THE MORNING TO STEADY YOUR NERVES TO GET RID OF A HANGOVER: NO
EVER FELT BAD OR GUILTY ABOUT YOUR DRINKING: NO

## 2025-06-25 ASSESSMENT — PAIN - FUNCTIONAL ASSESSMENT: PAIN_FUNCTIONAL_ASSESSMENT: 0-10

## 2025-06-25 ASSESSMENT — PAIN DESCRIPTION - PROGRESSION: CLINICAL_PROGRESSION: NOT CHANGED

## 2025-06-25 ASSESSMENT — PAIN DESCRIPTION - LOCATION: LOCATION: HEAD

## 2025-06-25 ASSESSMENT — PAIN SCALES - GENERAL: PAINLEVEL_OUTOF10: 5 - MODERATE PAIN

## 2025-06-25 NOTE — Clinical Note
Shaista Smith was seen and treated in our emergency department on 6/25/2025.  She may return to work on 06/27/2025.       If you have any questions or concerns, please don't hesitate to call.      Patricia Osorio PA-C
no

## 2025-06-26 ENCOUNTER — APPOINTMENT (OUTPATIENT)
Dept: RADIOLOGY | Facility: HOSPITAL | Age: 55
End: 2025-06-26
Payer: COMMERCIAL

## 2025-06-26 ENCOUNTER — CLINICAL SUPPORT (OUTPATIENT)
Dept: EMERGENCY MEDICINE | Facility: HOSPITAL | Age: 55
End: 2025-06-26
Payer: COMMERCIAL

## 2025-06-26 VITALS
HEART RATE: 78 BPM | WEIGHT: 238 LBS | OXYGEN SATURATION: 97 % | DIASTOLIC BLOOD PRESSURE: 102 MMHG | BODY MASS INDEX: 39.65 KG/M2 | SYSTOLIC BLOOD PRESSURE: 178 MMHG | TEMPERATURE: 98.3 F | HEIGHT: 65 IN | RESPIRATION RATE: 18 BRPM

## 2025-06-26 LAB
ATRIAL RATE: 86 BPM
P AXIS: 74 DEGREES
P OFFSET: 196 MS
P ONSET: 138 MS
PR INTERVAL: 168 MS
Q ONSET: 222 MS
QRS COUNT: 14 BEATS
QRS DURATION: 100 MS
QT INTERVAL: 356 MS
QTC CALCULATION(BAZETT): 426 MS
QTC FREDERICIA: 401 MS
R AXIS: 55 DEGREES
T AXIS: 25 DEGREES
T OFFSET: 400 MS
VENTRICULAR RATE: 86 BPM

## 2025-06-26 PROCEDURE — 2500000001 HC RX 250 WO HCPCS SELF ADMINISTERED DRUGS (ALT 637 FOR MEDICARE OP): Performed by: PHYSICIAN ASSISTANT

## 2025-06-26 PROCEDURE — 70450 CT HEAD/BRAIN W/O DYE: CPT

## 2025-06-26 PROCEDURE — 93005 ELECTROCARDIOGRAM TRACING: CPT

## 2025-06-26 PROCEDURE — 70450 CT HEAD/BRAIN W/O DYE: CPT | Performed by: STUDENT IN AN ORGANIZED HEALTH CARE EDUCATION/TRAINING PROGRAM

## 2025-06-26 RX ADMIN — ACETAMINOPHEN 975 MG: 325 TABLET ORAL at 01:08

## 2025-06-26 NOTE — ED PROVIDER NOTES
Emergency Department Encounter  [facility] EMERGENCY MEDICINE    Patient: Shaista Smith  MRN: [id number]  : 1970  Date of Evaluation: 2025  ED Provider: Patricia Osorio PA-C      Chief Complaint       Chief Complaint   Patient presents with    Hypertension     HPI    Shaista Smith is a 55 y.o. female who presents to the emergency department presenting for evaluation of HTN at work. Pt endorses PMH HTN, taking amlodipine and hydrochlorothiazide since  after she ran out of her meds for several months. Reports she has a moderate left sided headache, 5/10, that started insidiously at work today.  Does not typically get headaches.  Endorses that she has had a nonproductive cough for several weeks.  Denies any known ill contacts or COVID exposures.  Endorses that this is not the worst headache of her life, no thunderclap onset.  Denies any inciting head trauma or associated loss consciousness.  No associated dizziness, changes in hearing or vision, nausea or vomiting, numbness or tingling, weakness.  Does not take any blood thinners.  Does not have a PCP.  Denies any acute changes in gait or speech.    ROS:     Review of Systems  14 systems reviewed and otherwise acutely negative except as in the HPI.    Past History   Medical History[1]  Surgical History[2]  Social History[3]    Medications/Allergies     Previous Medications    ALBUTEROL (VENTOLIN HFA) 90 MCG/ACTUATION INHALER    Inhale 2 puffs. Every 4-6 hours as needed    AMLODIPINE (NORVASC) 10 MG TABLET    TAKE 1 TABLET BY MOUTH ONCE DAILY    CHOLECALCIFEROL (VITAMIN D-3) 50 MCG (2,000 UNIT) CAPSULE    Take 2 capsules (100 mcg) by mouth once daily.    FAMOTIDINE (PEPCID) 20 MG TABLET    Take 1 tablet (20 mg) by mouth once daily in the morning.    FERROUS SULFATE 325 (65 FE) MG TABLET    Take 1 tablet (325 mg) by mouth 3 times daily (morning, midday, late afternoon).    GABAPENTIN (NEURONTIN) 100 MG CAPSULE    Take 1 capsule (100 mg)  by mouth 3 times a day.    HYDROCHLOROTHIAZIDE (HYDRODIURIL) 25 MG TABLET    TAKE 1 TABLET BY MOUTH ONCE DAILY    HYDROXYZINE HCL (ATARAX) 10 MG TABLET    Take 1 tablet (10 mg) by mouth 3 times a day as needed for anxiety.    LOSARTAN (COZAAR) 50 MG TABLET    Take 1 tablet (50 mg) by mouth once daily.    NALTREXONE (DEPADE) 50 MG TABLET    Take 1 tablet (50 mg) by mouth once daily.    NAPROXEN (NAPROSYN) 500 MG TABLET    Take 1 tablet (500 mg) by mouth 2 times a day as needed (pain).    ODEFSEY 200-25-25 MG TABLET    Take 1 tablet by mouth once daily. With food    SERTRALINE (ZOLOFT) 100 MG TABLET    Take 2 tablets (200 mg) by mouth once daily.     Allergies[4]     Physical Exam       ED Triage Vitals [06/25/25 2253]   Temperature Heart Rate Respirations BP   36.4 °C (97.5 °F) (!) 103 18 (!) 143/104      Pulse Ox Temp src Heart Rate Source Patient Position   98 % -- -- --      BP Location FiO2 (%)     -- --         Physical Exam    Physical Exam:    VS: As documented in the triage note and EMR flowsheet from this visit were reviewed.    Appearance: Alert, oriented, cooperative, in no acute distress. Well nourished & well hydrated.    Skin: Warm, intact and dry.    Eyes: PERRLA, EOMs intact. No pain with EOMs. No nystagmus.    ENT: Hearing grossly intact. External auditory canals patent, tympanic membranes intact with visible landmarks. Nares patent, mucus membranes moist. Pharynx clear, uvula midline and non-edematous. No drooling, dysphagia or trismus. Voice non-muffled.    Neck: Supple, without meningismus. No lymphadenopathy. No midline or paraspinal tenderness    Pulmonary: Clear bilaterally with good chest wall excursion. No rales, rhonchi or wheezing. No accessory muscle use or stridor. Nonlabored breathing, no supplemental oxygen.     Cardiac: Normal S1, S2 without murmur, rub, gallop or extrasystole.     Abdomen: Soft, nontender, active bowel sounds. No rebound or guarding.    Musculoskeletal: Spontaneously  "moving all extremities without limitation. Extremities warm and well-perfused, capillary refill less than 2 seconds. Pulses full and equal.    Neurological:  Cranial nerves II through XII are grossly intact, finger-nose touch is normal, normal sensation, no weakness, no focal findings identified. Ambulating without assistance with steady gait, non-ataxic.    Psychiatric: Appropriate mood and affect. Kempt appearance.      Diagnostics   Radiographs:  CT head wo IV contrast   Final Result   No evidence of hemorrhage, CT apparent transcortical infarct, or   other acute intracranial abnormality.        MACRO:   None        Signed by: Barron Nazario 6/26/2025 1:06 AM   Dictation workstation:   [id number]        EKG #1  Date/Time: 6/26/25 0004  Interpreted by: Dr. HENOK Hays  NSR. Normal Qtc interval. No STEMI.  No dynamic changes as compared to previous EKG on 6/18/2025.    ED Course   Visit Vitals  BP (!) 143/104   Pulse (!) 103   Temp 36.4 °C (97.5 °F)   Resp 18   Ht 1.651 m (5' 5\")   Wt 108 kg (238 lb)   SpO2 98%   BMI 39.61 kg/m²   Smoking Status Former   BSA 2.23 m²     Medications   acetaminophen (Tylenol) tablet 975 mg (975 mg oral Given 6/26/25 0108)       Medical Decision Making   EKG NSR.  CTH WNL.  Pt encouraged to continue taking her home medications as previously prescribed. Keep blood pressure journal.  Referred to PCP for followup.    Final Impression      1. Hypertension, unspecified type          DISPOSITION  Disposition: discharge  Patient condition is: Stable    Comment: Please note this report has been produced using speech recognition software and may contain errors related to that system including errors in grammar, punctuation, and spelling, as well as words and phrases that may be inappropriate.  If there are any questions or concerns please feel free to contact the dictating provider for clarification.    Patricia Osorio PA-C         [1]   Past Medical History:  Diagnosis Date    " Abnormal cytological findings in specimens from other organs, systems and tissues     LSIL (low grade squamous intraepithelial lesion) on Pap smear    Abnormal weight loss 07/12/2016    Rapid weight loss    Acute candidiasis of vulva and vagina 10/03/2016    Yeast infection of the vagina    Anogenital (venereal) warts     Genital warts    Encounter for general adult medical examination without abnormal findings 09/28/2020    Encounter for preventive health examination    Encounter for screening for infections with a predominantly sexual mode of transmission 04/01/2019    Screening for STDs (sexually transmitted diseases)    Encounter for screening for malignant neoplasm of colon 08/31/2018    Screening for colon cancer    Food insecurity 01/05/2023    Food insecurity    Human immunodeficiency virus (HIV) disease (Multi)     HIV disease    Impacted cerumen, bilateral 08/31/2018    Bilateral impacted cerumen    Morbid (severe) obesity due to excess calories (Multi) 03/23/2015    Psychological factors affecting morbid obesity    Nausea 07/07/2015    Postoperative nausea    Other acute postprocedural pain 07/12/2016    Acute post-operative pain    Personal history of other diseases of the musculoskeletal system and connective tissue     Personal history of arthritis    Personal history of other endocrine, nutritional and metabolic disease     History of type 2 diabetes mellitus    Personal history of other endocrine, nutritional and metabolic disease 07/12/2016    History of diabetes mellitus    Personal history of other infectious and parasitic diseases     History of trichomoniasis    Personal history of other infectious and parasitic diseases     History of gonorrhea    Personal history of other infectious and parasitic diseases 05/05/2015    History of Helicobacter infection    Personal history of other infectious and parasitic diseases 03/23/2017    History of trichomoniasis    Personal history of other infectious  and parasitic diseases 02/02/2016    History of trichomoniasis    Personal history of other specified conditions 08/25/2015    History of wheezing    Personal history of urinary (tract) infections 02/26/2018    History of urinary tract infection    Personal history of urinary (tract) infections     Personal history of urinary tract infection    Personal history of urinary (tract) infections 08/31/2018    History of urinary tract infection    Unspecified abnormal findings in urine 10/03/2017    Abnormal urine    Urinary tract infection, site not specified     E. coli UTI    Urinary tract infection, site not specified 02/23/2018    Urinary tract infection with hematuria, site unspecified   [2]   Past Surgical History:  Procedure Laterality Date    BREAST BIOPSY  02/07/2014    Biopsy Breast Percutaneous Needle Core    COLPOSCOPY  02/07/2014    Colposcopy    OTHER SURGICAL HISTORY  02/07/2014    Uterine Fibroid Embolization    OTHER SURGICAL HISTORY  08/25/2015    Gastric Surgery For Morbid Obesity Laparoscopic Longitudinal Gastrectomy    TUBAL LIGATION  02/17/2015    Tubal Ligation   [3]   Social History  Socioeconomic History    Marital status: Single   Tobacco Use    Smoking status: Former     Types: Cigarettes   Substance and Sexual Activity    Alcohol use: Yes     Alcohol/week: 15.0 standard drinks of alcohol     Types: 15 Shots of liquor per week     Comment: drinking daily    Drug use: Never    Sexual activity: Not Currently   [4]   Allergies  Allergen Reactions    Dapsone Headache, Itching and Other     HA & eyes become red    Sulfamethoxazole Headache and Itching        Patricia Osorio PA-C  06/26/25 [id number]

## 2025-06-26 NOTE — DISCHARGE INSTRUCTIONS
EKG and CT of your head are normal.  Continue taking your blood pressure medications as prescribed, keep a blood pressure journal.  Follow up with PCP - referral placed.  Please call [phone] for Primary Care referral for follow up appointments.

## 2025-06-26 NOTE — ED TRIAGE NOTES
Pt enters ED reporting hypertension from home BP cuff, symptoms include blurred vision. Denies blurred vision, trauma or falls. Endorses 5/10 HA pain.

## 2025-06-29 ENCOUNTER — DOCUMENTATION (OUTPATIENT)
Dept: CARE COORDINATION | Age: 55
End: 2025-06-29
Payer: COMMERCIAL

## 2025-06-29 ENCOUNTER — PATIENT OUTREACH (OUTPATIENT)
Dept: CARE COORDINATION | Age: 55
End: 2025-06-29
Payer: COMMERCIAL

## 2025-06-29 NOTE — PROGRESS NOTES
Called pt to discuss weight gain  other vitals wnl  machine on mail box full  could not leave a message   Will  continue to monitor

## 2025-06-30 ENCOUNTER — PATIENT OUTREACH (OUTPATIENT)
Dept: CARE COORDINATION | Age: 55
End: 2025-06-30
Payer: COMMERCIAL

## 2025-06-30 NOTE — PROGRESS NOTES
Week 3 RPM Patient Call  6/30 Spoke to patient. Discussed SBP/DBP, Heart Failure, valve disease, afib, blood thinners, bleeding precautions, orthostatic hypotension, calcium scoring. Pt voiced understanding, VSS will continue to monitor. Ave Cantor RN    Pt Education: Blood pressure and Lifestyle modifications  Barriers: n/a  Topics for Daily Review: n/a  Pt demonstrates clear understanding: Yes    VS Readings  Daily Weight:  There were no vitals filed for this visit.   Last 3 Weights:  Wt Readings from Last 7 Encounters:   06/25/25 108 kg (238 lb)   06/19/25 108 kg (238 lb)   06/18/25 109 kg (241 lb)   06/17/25 110 kg (243 lb)   06/29/25 108 kg (238 lb)   12/20/24 102 kg (225 lb)   11/22/24 103 kg (227 lb 9.6 oz)     Blood Pressure:  BP Readings from Last 3 Encounters:   06/26/25 (!) 178/102   06/19/25 (!) 129/93   06/18/25 (!) 157/95          Masimo Device: Yes   Masimo Clinical Impression: MMO/RPM

## 2025-07-01 ENCOUNTER — APPOINTMENT (OUTPATIENT)
Dept: BEHAVIORAL HEALTH | Facility: CLINIC | Age: 55
End: 2025-07-01
Payer: COMMERCIAL

## 2025-07-01 ENCOUNTER — DOCUMENTATION (OUTPATIENT)
Dept: BEHAVIORAL HEALTH | Facility: CLINIC | Age: 55
End: 2025-07-01
Payer: COMMERCIAL

## 2025-07-01 NOTE — PROGRESS NOTES
7/1/25  Shaista was scheduled for a [facility] intake today.     However, her work schedule is Sun - Tuesday 6a-6p, and every other Wednesday for the next 6 months.   She would miss over half of the program. She was under the impression we meet on Fridays and wouldn't miss so much group time.     She is planning to talk with her employer and  to see if she can take time off of work or switch days with a co-worker.   If she can she will call back to reschedule her intake.     Lastly, she was open to weekly individual therapy as a back up option. I am reaching out to a provider who might have availability to take Shaista on as a client.     Genevieve Mcnulty, LPCC-S, LICDC

## 2025-07-05 ENCOUNTER — DOCUMENTATION (OUTPATIENT)
Dept: CARE COORDINATION | Age: 55
End: 2025-07-05
Payer: COMMERCIAL

## 2025-07-05 ENCOUNTER — PATIENT OUTREACH (OUTPATIENT)
Dept: CARE COORDINATION | Age: 55
End: 2025-07-05
Payer: COMMERCIAL

## 2025-07-05 NOTE — PROGRESS NOTES
Called pt about elevated bp on 7/4/26  machine on left message requested  test for today   Will continue  to monitor

## 2025-07-07 ENCOUNTER — APPOINTMENT (OUTPATIENT)
Dept: BEHAVIORAL HEALTH | Facility: CLINIC | Age: 55
End: 2025-07-07
Payer: COMMERCIAL

## 2025-07-14 ENCOUNTER — APPOINTMENT (OUTPATIENT)
Dept: BEHAVIORAL HEALTH | Facility: CLINIC | Age: 55
End: 2025-07-14
Payer: COMMERCIAL

## 2025-07-14 ENCOUNTER — PATIENT OUTREACH (OUTPATIENT)
Dept: CARE COORDINATION | Age: 55
End: 2025-07-14

## 2025-07-21 ENCOUNTER — APPOINTMENT (OUTPATIENT)
Dept: PRIMARY CARE | Facility: CLINIC | Age: 55
End: 2025-07-21
Payer: COMMERCIAL

## 2025-07-23 ENCOUNTER — DOCUMENTATION (OUTPATIENT)
Dept: CARE COORDINATION | Age: 55
End: 2025-07-23
Payer: COMMERCIAL

## 2025-07-28 ENCOUNTER — PATIENT OUTREACH (OUTPATIENT)
Dept: CARE COORDINATION | Age: 55
End: 2025-07-28
Payer: COMMERCIAL

## 2025-08-11 ENCOUNTER — PATIENT OUTREACH (OUTPATIENT)
Dept: CARE COORDINATION | Age: 55
End: 2025-08-11
Payer: COMMERCIAL